# Patient Record
Sex: MALE | Race: WHITE | NOT HISPANIC OR LATINO | URBAN - METROPOLITAN AREA
[De-identification: names, ages, dates, MRNs, and addresses within clinical notes are randomized per-mention and may not be internally consistent; named-entity substitution may affect disease eponyms.]

---

## 2023-08-29 LAB
ALANINE AMINOTRANSFERASE (SGPT) (U/L) IN SER/PLAS: 16 U/L (ref 10–52)
ALBUMIN (G/DL) IN SER/PLAS: 4.3 G/DL (ref 3.4–5)
ALKALINE PHOSPHATASE (U/L) IN SER/PLAS: 86 U/L (ref 33–136)
ANION GAP IN SER/PLAS: 13 MMOL/L (ref 10–20)
ASPARTATE AMINOTRANSFERASE (SGOT) (U/L) IN SER/PLAS: 19 U/L (ref 9–39)
BASOPHILS (10*3/UL) IN BLOOD BY AUTOMATED COUNT: 0.09 X10E9/L (ref 0–0.1)
BASOPHILS/100 LEUKOCYTES IN BLOOD BY AUTOMATED COUNT: 1.4 % (ref 0–2)
BILIRUBIN TOTAL (MG/DL) IN SER/PLAS: 0.4 MG/DL (ref 0–1.2)
CALCIDIOL (25 OH VITAMIN D3) (NG/ML) IN SER/PLAS: 39 NG/ML
CALCIUM (MG/DL) IN SER/PLAS: 9.9 MG/DL (ref 8.6–10.6)
CARBON DIOXIDE, TOTAL (MMOL/L) IN SER/PLAS: 28 MMOL/L (ref 21–32)
CHLORIDE (MMOL/L) IN SER/PLAS: 103 MMOL/L (ref 98–107)
CHOLESTEROL (MG/DL) IN SER/PLAS: 197 MG/DL (ref 0–199)
CHOLESTEROL IN HDL (MG/DL) IN SER/PLAS: 49.7 MG/DL
CHOLESTEROL/HDL RATIO: 4
CREATININE (MG/DL) IN SER/PLAS: 0.9 MG/DL (ref 0.5–1.3)
EOSINOPHILS (10*3/UL) IN BLOOD BY AUTOMATED COUNT: 0.17 X10E9/L (ref 0–0.7)
EOSINOPHILS/100 LEUKOCYTES IN BLOOD BY AUTOMATED COUNT: 2.7 % (ref 0–6)
ERYTHROCYTE DISTRIBUTION WIDTH (RATIO) BY AUTOMATED COUNT: 13.2 % (ref 11.5–14.5)
ERYTHROCYTE MEAN CORPUSCULAR HEMOGLOBIN CONCENTRATION (G/DL) BY AUTOMATED: 33.3 G/DL (ref 32–36)
ERYTHROCYTE MEAN CORPUSCULAR VOLUME (FL) BY AUTOMATED COUNT: 92 FL (ref 80–100)
ERYTHROCYTES (10*6/UL) IN BLOOD BY AUTOMATED COUNT: 5.04 X10E12/L (ref 4.5–5.9)
GFR MALE: >90 ML/MIN/1.73M2
GLUCOSE (MG/DL) IN SER/PLAS: 89 MG/DL (ref 74–99)
HEMATOCRIT (%) IN BLOOD BY AUTOMATED COUNT: 46.6 % (ref 41–52)
HEMOGLOBIN (G/DL) IN BLOOD: 15.5 G/DL (ref 13.5–17.5)
IMMATURE GRANULOCYTES/100 LEUKOCYTES IN BLOOD BY AUTOMATED COUNT: 0.5 % (ref 0–0.9)
LDL: 125 MG/DL (ref 0–99)
LEUKOCYTES (10*3/UL) IN BLOOD BY AUTOMATED COUNT: 6.2 X10E9/L (ref 4.4–11.3)
LYMPHOCYTES (10*3/UL) IN BLOOD BY AUTOMATED COUNT: 1.47 X10E9/L (ref 1.2–4.8)
LYMPHOCYTES/100 LEUKOCYTES IN BLOOD BY AUTOMATED COUNT: 23.6 % (ref 13–44)
MONOCYTES (10*3/UL) IN BLOOD BY AUTOMATED COUNT: 0.54 X10E9/L (ref 0.1–1)
MONOCYTES/100 LEUKOCYTES IN BLOOD BY AUTOMATED COUNT: 8.7 % (ref 2–10)
NEUTROPHILS (10*3/UL) IN BLOOD BY AUTOMATED COUNT: 3.94 X10E9/L (ref 1.2–7.7)
NEUTROPHILS/100 LEUKOCYTES IN BLOOD BY AUTOMATED COUNT: 63.1 % (ref 40–80)
NRBC (PER 100 WBCS) BY AUTOMATED COUNT: 0 /100 WBC (ref 0–0)
PLATELETS (10*3/UL) IN BLOOD AUTOMATED COUNT: 329 X10E9/L (ref 150–450)
POTASSIUM (MMOL/L) IN SER/PLAS: 4.4 MMOL/L (ref 3.5–5.3)
PROSTATE SPECIFIC AG (NG/ML) IN SER/PLAS: 2.38 NG/ML (ref 0–4)
PROTEIN TOTAL: 7.3 G/DL (ref 6.4–8.2)
SODIUM (MMOL/L) IN SER/PLAS: 140 MMOL/L (ref 136–145)
TRIGLYCERIDE (MG/DL) IN SER/PLAS: 114 MG/DL (ref 0–149)
UREA NITROGEN (MG/DL) IN SER/PLAS: 13 MG/DL (ref 6–23)
VLDL: 23 MG/DL (ref 0–40)

## 2023-09-27 DIAGNOSIS — M25.571 RIGHT ANKLE PAIN, UNSPECIFIED CHRONICITY: Primary | ICD-10-CM

## 2023-09-29 ENCOUNTER — HOSPITAL ENCOUNTER (OUTPATIENT)
Dept: DATA CONVERSION | Facility: HOSPITAL | Age: 63
Discharge: HOME | End: 2023-09-29
Payer: COMMERCIAL

## 2023-10-03 ENCOUNTER — TREATMENT (OUTPATIENT)
Dept: PHYSICAL THERAPY | Facility: CLINIC | Age: 63
End: 2023-10-03
Payer: COMMERCIAL

## 2023-10-03 DIAGNOSIS — M25.571 RIGHT ANKLE PAIN, UNSPECIFIED CHRONICITY: ICD-10-CM

## 2023-10-03 PROCEDURE — 97112 NEUROMUSCULAR REEDUCATION: CPT | Mod: CQ,GP

## 2023-10-03 PROCEDURE — 97110 THERAPEUTIC EXERCISES: CPT | Mod: GP

## 2023-10-03 ASSESSMENT — PAIN SCALES - GENERAL: PAINLEVEL_OUTOF10: 0 - NO PAIN

## 2023-10-03 ASSESSMENT — PAIN - FUNCTIONAL ASSESSMENT: PAIN_FUNCTIONAL_ASSESSMENT: 0-10

## 2023-10-03 NOTE — PROGRESS NOTES
"2  Physical Therapy Treatment    Patient Name: Loyd Rojas  MRN: [id number]  Today's Date: 10/3/2023  Time Calculation  Start Time: 0800  Stop Time: 0845  Time Calculation (min): 45 min      Assessment:  PT Assessment  Rehab Prognosis: Good  Assessment Comment: Pt tolerates treatment progressions without exacerbation of pain S/S, Pt reported \"fatigue\" of LE, ankle musculature, fewer LOBs observed throughout  activites as Pt requires minimal UE assist throughout.    Plan:       Current Problem  1. Right ankle pain, unspecified chronicity  PT eval and treat          Subjective   General  Pt reports reduced pain/irritation @ R ankle, feeling more steady.     Precautions     Vital Signs     Pain  Pain Assessment: 0-10  Pain Score: 0 - No pain    Objective   Cognition     Posture     Extremity/Trunk Assessment    Outcome Measures:      Treatments:  Therapeutic Exercise  Therapeutic Exercise Performed: Yes  Therapeutic Exercise Activity 1: Schwinn bike L7, 6'  Therapeutic Exercise Activity 2: Gastroc stretch 30\"x3 wedge  Therapeutic Exercise Activity 3: Soleus stretch 30\"x3  Therapeutic Exercise Activity 4: Standing PF/DF x20 each  Therapeutic Exercise Activity 5: Step ups on 6\" step 2x10  Therapeutic Exercise Activity 6: Shallow lunges 2x10 L/R  Therapeutic Exercise Activity 7: Sit to stand from chair 2x12    Balance/Neuromuscular Re-Education  Balance/Neuromuscular Re-Education Activity Performed: Yes  Balance/Neuromuscular Re-Education Activity 1: MIP on foam 3'  Balance/Neuromuscular Re-Education Activity 2: LOS on foam A/P 2'  Balance/Neuromuscular Re-Education Activity 3: LOS on foam L/R 2'  Balance/Neuromuscular Re-Education Activity 4: Sidestep on foam beam 6 laps  Balance/Neuromuscular Re-Education Activity 5: Tandemwalk F/B on foam beam 6 laps  Activity:  Endurance: Tolerates 30+ min exercise without fatigue    OP EDUCATION:       Goals:     "

## 2023-10-05 ENCOUNTER — TREATMENT (OUTPATIENT)
Dept: PHYSICAL THERAPY | Facility: CLINIC | Age: 63
End: 2023-10-05
Payer: COMMERCIAL

## 2023-10-05 ENCOUNTER — OFFICE VISIT (OUTPATIENT)
Dept: UROLOGY | Facility: HOSPITAL | Age: 63
End: 2023-10-05
Payer: COMMERCIAL

## 2023-10-05 DIAGNOSIS — M79.671 RIGHT FOOT PAIN: Primary | ICD-10-CM

## 2023-10-05 DIAGNOSIS — N48.6 PEYRONIE DISEASE: ICD-10-CM

## 2023-10-05 DIAGNOSIS — R31.29 HEMATURIA, MICROSCOPIC: Primary | ICD-10-CM

## 2023-10-05 DIAGNOSIS — N52.9 ERECTILE DYSFUNCTION, UNSPECIFIED ERECTILE DYSFUNCTION TYPE: ICD-10-CM

## 2023-10-05 DIAGNOSIS — M25.571 RIGHT ANKLE PAIN, UNSPECIFIED CHRONICITY: ICD-10-CM

## 2023-10-05 PROBLEM — R11.0 NAUSEA IN ADULT: Status: ACTIVE | Noted: 2023-10-05

## 2023-10-05 PROBLEM — M79.673 FOOT PAIN: Status: ACTIVE | Noted: 2023-10-05

## 2023-10-05 PROBLEM — R53.83 FATIGUE: Status: ACTIVE | Noted: 2023-10-05

## 2023-10-05 PROBLEM — N40.1 BPH WITH OBSTRUCTION/LOWER URINARY TRACT SYMPTOMS: Status: ACTIVE | Noted: 2023-10-05

## 2023-10-05 PROBLEM — R52 GENERALIZED BODY ACHES: Status: ACTIVE | Noted: 2023-10-05

## 2023-10-05 PROBLEM — H90.3 BILATERAL SENSORINEURAL HEARING LOSS: Status: ACTIVE | Noted: 2023-10-05

## 2023-10-05 PROBLEM — G47.00 INSOMNIA: Status: ACTIVE | Noted: 2023-10-05

## 2023-10-05 PROBLEM — R03.0 ELEVATED BP WITHOUT DIAGNOSIS OF HYPERTENSION: Status: ACTIVE | Noted: 2023-10-05

## 2023-10-05 PROBLEM — R09.82 POST-NASAL DISCHARGE: Status: ACTIVE | Noted: 2023-10-05

## 2023-10-05 PROBLEM — K21.9 GERD (GASTROESOPHAGEAL REFLUX DISEASE): Status: ACTIVE | Noted: 2023-10-05

## 2023-10-05 PROBLEM — J34.89 SINUS PRESSURE: Status: ACTIVE | Noted: 2023-10-05

## 2023-10-05 PROBLEM — F41.9 ANXIETY DISORDER: Status: ACTIVE | Noted: 2023-10-05

## 2023-10-05 PROBLEM — R11.2 NAUSEA WITH VOMITING: Status: ACTIVE | Noted: 2023-10-05

## 2023-10-05 PROBLEM — R09.81 NASAL CONGESTION: Status: ACTIVE | Noted: 2023-10-05

## 2023-10-05 PROBLEM — N13.8 BPH WITH OBSTRUCTION/LOWER URINARY TRACT SYMPTOMS: Status: ACTIVE | Noted: 2023-10-05

## 2023-10-05 PROBLEM — H93.13 TINNITUS OF BOTH EARS: Status: ACTIVE | Noted: 2023-10-05

## 2023-10-05 PROBLEM — M77.30 CALCANEAL SPUR: Status: ACTIVE | Noted: 2023-10-05

## 2023-10-05 PROBLEM — M62.830 LUMBAR PARASPINAL MUSCLE SPASM: Status: ACTIVE | Noted: 2023-10-05

## 2023-10-05 PROBLEM — J18.9 ATYPICAL PNEUMONIA: Status: ACTIVE | Noted: 2023-10-05

## 2023-10-05 PROBLEM — R51.9 WORSENING HEADACHES: Status: ACTIVE | Noted: 2023-10-05

## 2023-10-05 PROBLEM — K52.9 CHRONIC DIARRHEA: Status: ACTIVE | Noted: 2023-10-05

## 2023-10-05 PROBLEM — M62.838 MUSCLE SPASMS OF NECK: Status: ACTIVE | Noted: 2023-10-05

## 2023-10-05 PROBLEM — R31.9 HEMATURIA: Status: ACTIVE | Noted: 2023-10-05

## 2023-10-05 PROBLEM — R53.83 FATIGUE: Status: RESOLVED | Noted: 2023-10-05 | Resolved: 2023-10-05

## 2023-10-05 PROBLEM — G43.909 MIGRAINE, UNSPECIFIED, NOT INTRACTABLE, WITHOUT STATUS MIGRAINOSUS: Status: ACTIVE | Noted: 2023-10-05

## 2023-10-05 PROBLEM — R06.02 SOB (SHORTNESS OF BREATH) ON EXERTION: Status: ACTIVE | Noted: 2023-10-05

## 2023-10-05 PROBLEM — F17.210 CIGARETTE NICOTINE DEPENDENCE WITHOUT COMPLICATION: Status: ACTIVE | Noted: 2023-10-05

## 2023-10-05 PROBLEM — R51.9 HEADACHE: Status: ACTIVE | Noted: 2023-10-05

## 2023-10-05 PROCEDURE — 97110 THERAPEUTIC EXERCISES: CPT | Mod: GP

## 2023-10-05 PROCEDURE — 99214 OFFICE O/P EST MOD 30 MIN: CPT | Performed by: UROLOGY

## 2023-10-05 PROCEDURE — 99204 OFFICE O/P NEW MOD 45 MIN: CPT | Performed by: UROLOGY

## 2023-10-05 RX ORDER — OMEPRAZOLE 40 MG/1
40 CAPSULE, DELAYED RELEASE ORAL DAILY
COMMUNITY
End: 2024-01-15

## 2023-10-05 RX ORDER — FLUTICASONE PROPIONATE 50 MCG
1 SPRAY, SUSPENSION (ML) NASAL NIGHTLY
COMMUNITY

## 2023-10-05 RX ORDER — OXYCODONE AND ACETAMINOPHEN 5; 325 MG/1; MG/1
1 TABLET ORAL EVERY 6 HOURS PRN
COMMUNITY
End: 2024-05-16 | Stop reason: ALTCHOICE

## 2023-10-05 RX ORDER — CEPHALEXIN 500 MG/1
500 CAPSULE ORAL 3 TIMES DAILY
COMMUNITY
Start: 2023-07-27

## 2023-10-05 RX ORDER — OXYCODONE AND ACETAMINOPHEN 5; 325 MG/1; MG/1
1 TABLET ORAL EVERY 6 HOURS PRN
COMMUNITY
Start: 2023-07-20 | End: 2024-05-16 | Stop reason: ALTCHOICE

## 2023-10-05 RX ORDER — RIVAROXABAN 10 MG/1
10 TABLET, FILM COATED ORAL DAILY
COMMUNITY
Start: 2023-07-20 | End: 2024-05-16 | Stop reason: ALTCHOICE

## 2023-10-05 RX ORDER — CYCLOBENZAPRINE HCL 10 MG
10 TABLET ORAL 3 TIMES DAILY PRN
COMMUNITY
Start: 2023-07-20 | End: 2024-05-16 | Stop reason: ALTCHOICE

## 2023-10-05 RX ORDER — HYDROCODONE BITARTRATE AND ACETAMINOPHEN 5; 325 MG/1; MG/1
1 TABLET ORAL EVERY 4 HOURS PRN
COMMUNITY
Start: 2023-01-24

## 2023-10-05 RX ORDER — NAPROXEN SODIUM 220 MG
TABLET ORAL
COMMUNITY
Start: 2017-12-18

## 2023-10-05 RX ORDER — ONDANSETRON 4 MG/1
4 TABLET, FILM COATED ORAL EVERY 6 HOURS PRN
COMMUNITY
Start: 2023-07-20

## 2023-10-05 RX ORDER — ACETAMINOPHEN 500 MG
TABLET ORAL
COMMUNITY
Start: 2017-12-18

## 2023-10-05 RX ORDER — SULFAMETHOXAZOLE AND TRIMETHOPRIM 800; 160 MG/1; MG/1
1 TABLET ORAL 2 TIMES DAILY
COMMUNITY
Start: 2023-07-27 | End: 2024-05-16 | Stop reason: ALTCHOICE

## 2023-10-05 RX ORDER — SILDENAFIL CITRATE 20 MG/1
1-5 TABLET ORAL SEE ADMIN INSTRUCTIONS
COMMUNITY
Start: 2018-10-31

## 2023-10-05 RX ORDER — PREDNISONE 20 MG/1
20 TABLET ORAL DAILY
COMMUNITY
Start: 2022-10-19 | End: 2024-05-16 | Stop reason: ALTCHOICE

## 2023-10-05 ASSESSMENT — ENCOUNTER SYMPTOMS
LOSS OF SENSATION IN FEET: 0
OCCASIONAL FEELINGS OF UNSTEADINESS: 0
DEPRESSION: 0

## 2023-10-05 ASSESSMENT — PAIN SCALES - GENERAL: PAINLEVEL_OUTOF10: 0 - NO PAIN

## 2023-10-05 ASSESSMENT — PAIN - FUNCTIONAL ASSESSMENT: PAIN_FUNCTIONAL_ASSESSMENT: 0-10

## 2023-10-05 NOTE — PROGRESS NOTES
NAME:Loyd Rojas  DATE: 10/5/2023               Subjective:   Chief complaint: Microscopic hematuria    HPI:  63 y.o. male presenting for evaluation of peyronie's disease and microscopic hematuria at the request of Dr. Fang    Pts main concern is some penile curvature.  Reports some right lateral curve, approx 20deg, does have some hinge defect.  Unable to have penetrative intercourse.  Erections not great.  No improvement with PDE5i.  Denies any inciting event    Pt had MENDEZ (8.31.23) - No stones, hydro, masses. small simple cysts. Prostate measured 60g.  Reports he has had multiple prior cystoscopy's all negative.  Last a few years ago     Past Medical History:   Diagnosis Date    Cough variant asthma 09/19/2014    Cough variant asthma    Deficiency of other specified B group vitamins     Vitamin B12 deficiency    Other conditions influencing health status 11/20/2017    History of cough    Personal history of other diseases of the musculoskeletal system and connective tissue 01/02/2014    History of low back pain    Personal history of other diseases of the respiratory system 05/19/2018    History of acute sinusitis    Personal history of other diseases of the respiratory system 06/22/2017    History of acute sinusitis    Personal history of other diseases of the respiratory system 09/19/2014    History of acute bronchitis with bronchospasm    Personal history of other specified conditions 06/22/2017    History of nasal congestion    Personal history of other specified conditions 06/22/2017    History of diarrhea    Personal history of other specified conditions 03/04/2016    History of headache     Past Surgical History:   Procedure Laterality Date    BACK SURGERY  01/02/2014    Back Surgery    OTHER SURGICAL HISTORY  01/02/2014    Facial Surgery    TONSILLECTOMY  01/02/2014    Tonsillectomy     Social History     Tobacco Use    Smoking status: Not on file    Smokeless tobacco: Not on file   Substance Use  Topics    Alcohol use: Not on file     Family History   Problem Relation Name Age of Onset    Hypertension Mother      Dementia Father      Hypertension Brother      Kidney cancer Mother's Brother      Heart attack Maternal Grandfather      Dementia Other Family history     Skin cancer Other Family history     Other (stromal sarcoma of the stomach) Other Family history      [unfilled]  No current outpatient medications on file prior to visit.     No current facility-administered medications on file prior to visit.     Loyd has No Known Allergies.     Review of Systems    14 point ROS reviewed and discussed with the patient. Pertinent positives/negatives discussed in the History of Present Illness (HPI).      Objective:     There is no height or weight on file to calculate BMI.   There were no vitals taken for this visit.     Physical Exam   1. Constitutional: NAD, Well-developed, Well-nourished  2. Respiratory: Unlabored breathing, no audible wheezes, no use of accessory muscles   3. Cardiovascular: No JVD, extremities perfused, no edema  4. Abdomen: Soft, non-tender, non-distended, no masses or hernia.  No CVA tenderness.    5. Skin: no visible lesions, plaque, rashes, jaundice  6. Neuro: Gait normal, no focal neurologic deficit  7. Psychiatric: Mood and affect normal and appropriate, alert and oriented  8. :    Phallus: Normal,  dorsal plaque   Meatus: Orthotopic and patent.   Testes:  Descended bilaterally, symmetric, without tenderness or mass.   Epididymis is normal bilaterally.  No hydrocele.  No varicocele.   Scrotum: No lesions.   Inguinal canal: No hernia or tenderness.      Labs  Lab Results   Component Value Date    BUN 13 08/29/2023    CREATININE 0.90 08/29/2023     08/29/2023    K 4.4 08/29/2023     08/29/2023    CO2 28 08/29/2023    CALCIUM 9.9 08/29/2023      Lab Results   Component Value Date    WBC 6.2 08/29/2023    RBC 5.04 08/29/2023    HGB 15.5 08/29/2023    HCT 46.6 08/29/2023     MCV 92 08/29/2023    MCHC 33.3 08/29/2023    RDW 13.2 08/29/2023     08/29/2023        Lab Results   Component Value Date    PSA 2.38 08/29/2023    PSA 1.63 01/07/2020    PSA 1.52 11/20/2017        Assessment/Plan:   Loyd Rojas presents with       1. Hematuria, microscopic    2. Peyronie disease    3. Erectile dysfunction, unspecified erectile dysfunction type        1) Has had prior negative workup, MENDEZ reviewed.  Wants to hold off another cystoscopy at this time    2) Peyronie's Disease  -Poor erections, slight lateral curve with some hinge defect    The patient presents with Peyronie's disease.  I educated the patient about the disease, and discussed conservative versus surgical management of the disease.  Furthermore, I clarified that the disease usually presents in two phases. The first is an initial active phase characterized by inflammation and possible associated pain, and a second quiescent phase. I explained that for a patient to be a surgical candidate he must have stable and mature disease.  We discussed use of in-office intracavernosal injection (ICI) with or without duplex Doppler ultrasound as further diagnostic testing.    Will schedule for penile doppler

## 2023-10-05 NOTE — PROGRESS NOTES
Physical Therapy    Physical Therapy Treatment    Patient Name: Loyd Rojas  MRN: [id number]  Today's Date: 10/5/2023  Visit 7/10         Assessment:  PT Assessment  Rehab Prognosis: Good    Emphasis of todays treatment was to focus on CKC exercises and improving tolerance to activity with standing exercises.  Pt will cont to progress with skilled PT to continue to address above impairments      Plan:   Cont to address ecc loading of achilles within pain free ranges     Current Problem  1. Right foot pain        2. Right ankle pain, unspecified chronicity  PT eval and treat          Subjective   General  Reason for Referral: pt reports min soreness day after PT, educated pt to cont with strengthening exercises at home       Pain  Pain Assessment: 0-10  Pain Score: 0 - No pain    Objective   Good tolerance to activity with ecc loading          Treatments:  Therapeutic Exercise  Therapeutic Exercise Performed: Yes  Therapeutic Exercise Activity 1: schwinn bike x 5 mins  Therapeutic Exercise Activity 2: sit to stands 3 x 10  Therapeutic Exercise Activity 3: Purple tband SL stance 10 x 5 sec holds  Therapeutic Exercise Activity 4: Purple tband calf raises 2 x 10  Therapeutic Exercise Activity 5: 6 in step downs 3 x 10  Therapeutic Exercise Activity 6: Standing calf stretch 3x10  Therapeutic Exercise Activity 7: rocker board 3 x 1 min      OP EDUCATION:  Education  Individual(s) Educated: Patient    Goals:  Encounter Problems       Encounter Problems (Active)       PT Problem       pt will demonstrate indep gait without pain or functional limitations       Start:  10/05/23    Expected End:  01/03/24            pt will demonstrate negotiating 8 steps without pain or functional limitations        Start:  10/05/23    Expected End:  01/03/24            pt will report no greater than 2/10 pain       Start:  10/05/23    Expected End:  01/03/24            pt will be indep with HEP       Start:  10/05/23    Expected End:   01/03/24

## 2023-10-10 ENCOUNTER — TREATMENT (OUTPATIENT)
Dept: PHYSICAL THERAPY | Facility: CLINIC | Age: 63
End: 2023-10-10
Payer: COMMERCIAL

## 2023-10-10 DIAGNOSIS — M25.571 RIGHT ANKLE PAIN, UNSPECIFIED CHRONICITY: ICD-10-CM

## 2023-10-10 PROCEDURE — 97110 THERAPEUTIC EXERCISES: CPT | Mod: CQ,GP

## 2023-10-10 PROCEDURE — 97112 NEUROMUSCULAR REEDUCATION: CPT | Mod: CQ,GP

## 2023-10-10 ASSESSMENT — PAIN SCALES - GENERAL: PAINLEVEL_OUTOF10: 0 - NO PAIN

## 2023-10-10 ASSESSMENT — PAIN - FUNCTIONAL ASSESSMENT: PAIN_FUNCTIONAL_ASSESSMENT: 0-10

## 2023-10-10 NOTE — PROGRESS NOTES
"Physical Therapy    Physical Therapy Treatment    Patient Name: Loyd Rojas  MRN: [id number]  Today's Date: 10/10/2023  Time Calculation  Start Time: 0758  Stop Time: 0845  Time Calculation (min): 47 minVisit 8/10      Assessment:  PT Assessment  Rehab Prognosis: Excellent  Assessment Comment: Pt tolerates treatment without exacerbation of pain S/S, demonstrates improved gait mechanics and improved recovery after ADLs and increasing standing work type activity at home.        Current Problem  1. Right ankle pain, unspecified chronicity  PT eval and treat          Subjective    Pt reports walking 1 mile then worked on standing activities for remainder of the day with mild soreness on waking this morning.     Pain  Pain Assessment: 0-10  Pain Score: 0 - No pain    Objective Minimal gait deviation with AMB, minimal LOBs during balance activities.      Treatments:  Therapeutic Exercise  Therapeutic Exercise Performed: Yes  Therapeutic Exercise Activity 1: schwinn bike L7, 6'  Therapeutic Exercise Activity 2: Gastroc stretch 30\"x3 wedge  Therapeutic Exercise Activity 3: ecentric calf raises 2x15  Therapeutic Exercise Activity 4: Gastroc stretch 30\"x3 on step  Therapeutic Exercise Activity 5: Squats on foam 2x10  Therapeutic Exercise Activity 6: Rocker board A/P x 3'    Balance/Neuromuscular Re-Education  Balance/Neuromuscular Re-Education Activity Performed: Yes  Balance/Neuromuscular Re-Education Activity 1: MIP on foam 3'  Balance/Neuromuscular Re-Education Activity 2: Sidestep on foam beam 6 laps  Balance/Neuromuscular Re-Education Activity 3: Tandemwalk F/B on foam beam 6 laps  Balance/Neuromuscular Re-Education Activity 4: Sport core walk outs x10  Balance/Neuromuscular Re-Education Activity 5: Retro sport cord walkouts x10  Balance/Neuromuscular Re-Education Activity 6: Step tap on foam to 6\" step 3x10 /R each  Activity:  Endurance: Tolerates 30+ min exercise without fatigue    Goals:   will report one full day " of work (full duty) without pain to indicate ability to return to work and ADLs without limitation.     will report driving without pain to allow for return to work and ADLs without limitation.    will improve LEFS score by at least 9 points (minimal clinically important difference) in order to reflect increased ease in completing ADL's/IADL's     to increase core/B LE strength in deficient muscles by >/= 1/2 MMT grade to improve dynamic stability during household/recreational/occupational tasks.     will demonstrate independence and report compliance with HEP to facilitate independent rehab program upon discharge.

## 2023-10-12 ENCOUNTER — TREATMENT (OUTPATIENT)
Dept: PHYSICAL THERAPY | Facility: CLINIC | Age: 63
End: 2023-10-12
Payer: COMMERCIAL

## 2023-10-12 DIAGNOSIS — M25.571 RIGHT ANKLE PAIN, UNSPECIFIED CHRONICITY: ICD-10-CM

## 2023-10-12 PROCEDURE — 97110 THERAPEUTIC EXERCISES: CPT | Mod: GP,CQ

## 2023-10-12 PROCEDURE — 97112 NEUROMUSCULAR REEDUCATION: CPT | Mod: CQ,GP

## 2023-10-12 ASSESSMENT — PAIN - FUNCTIONAL ASSESSMENT: PAIN_FUNCTIONAL_ASSESSMENT: 0-10

## 2023-10-12 ASSESSMENT — PAIN SCALES - GENERAL: PAINLEVEL_OUTOF10: 0 - NO PAIN

## 2023-10-12 NOTE — PROGRESS NOTES
"Physical Therapy    Physical Therapy Treatment    Patient Name: Loyd Rojas  MRN: [id number]  Today's Date: 10/12/2023   Visit 9/10  Assessment:  PT Assessment  Rehab Prognosis: Excellent  Assessment Comment: Tolerates multiplanar movement with R ankle fatigue transient increases in irritation      Current Problem  1. Right ankle pain, unspecified chronicity  PT eval and treat       Subjective   General   Pt reports completing ADLs with minimal irritation, does state soreness with extended standing activity with expected recovery time.  Pain  Pain Assessment: 0-10  Pain Score: 0 - No pain    Objective   Moderate brett observed during agility ladder activities.    Treatments:  Therapeutic Exercise  Therapeutic Exercise Performed: Yes  Therapeutic Exercise Activity 1: schwinn bike L7, 6'  Therapeutic Exercise Activity 2: Gastroc stretch 30\"x3 wedge  Therapeutic Exercise Activity 3: ecentric calf raises 2x15  Therapeutic Exercise Activity 4: Gastroc stretch 30\"x3 on step  Therapeutic Exercise Activity 5: Squats on foam 2x10  Therapeutic Exercise Activity 6: Rocker board A/P x 3'  Therapeutic Exercise Activity 7: Rocker board L/R x2'    Balance/Neuromuscular Re-Education  Balance/Neuromuscular Re-Education Activity Performed: Yes  Balance/Neuromuscular Re-Education Activity 1: MIP on foam 3'  Balance/Neuromuscular Re-Education Activity 2: FWD lunges onto BOSU 2x10 L/R  Balance/Neuromuscular Re-Education Activity 3: Step tap on foam to 8\" step 2x10 /R each  Balance/Neuromuscular Re-Education Activity 4: Agility ladder sidestepping, FWD 2 feet in, FWD 1 foot in, and lateral 3 steps, 3 laps each  Activity:  Endurance: Tolerates 30+ min exercise without fatigue      Goals:   will report one full day of work (full duty) without pain to indicate ability to return to work and ADLs without limitation.      will report driving without pain to allow for return to work and ADLs without limitation.     will improve LEFS score " by at least 9 points (minimal clinically important difference) in order to reflect increased ease in completing ADL's/IADL's      to increase core/B LE strength in deficient muscles by >/= 1/2 MMT grade to improve dynamic stability during household/recreational/occupational tasks.      will demonstrate independence and report compliance with HEP to facilitate independent rehab program upon discharge.

## 2023-10-17 ENCOUNTER — TREATMENT (OUTPATIENT)
Dept: PHYSICAL THERAPY | Facility: CLINIC | Age: 63
End: 2023-10-17
Payer: COMMERCIAL

## 2023-10-17 DIAGNOSIS — M25.571 RIGHT ANKLE PAIN, UNSPECIFIED CHRONICITY: ICD-10-CM

## 2023-10-17 DIAGNOSIS — M79.671 RIGHT FOOT PAIN: Primary | ICD-10-CM

## 2023-10-17 PROCEDURE — 97110 THERAPEUTIC EXERCISES: CPT | Mod: GP

## 2023-10-17 ASSESSMENT — PAIN SCALES - GENERAL: PAINLEVEL_OUTOF10: 0 - NO PAIN

## 2023-10-17 ASSESSMENT — PAIN - FUNCTIONAL ASSESSMENT: PAIN_FUNCTIONAL_ASSESSMENT: 0-10

## 2023-10-17 NOTE — PROGRESS NOTES
Physical Therapy Progress Report    Patient Name: Loyd Rojas  MRN: [id number]  Today's Date: 10/17/2023       Current Problem   1. Right foot pain        2. Right ankle pain, unspecified chronicity  PT eval and treat          Subjective   General   Reason for Referral: Reassessment for R achilles repair  Pain   Pain Assessment: 0-10  Pain Score: 0 - No pain    Objective   ROM   R ankle AROM: WFL  L Ankle AROM: WFL  MMT   Functional testing:  R Ankle:5/5  L ankle 5/5      Palpation :  No pain on palpation    Transfers :  Sit to stand transfers indep    Gait   Indep gait, no functional limitations present   Stairs   Recip step gait pattern present     Balance   Single leg: Firm Eyes Open 10 sec WFL      Treatments:  Nu step  x 5 minutes  Reassessment  Review HEP    Assessment   Assessment:    Loyd presents to the clinic for a formal reassessment today. Pt is demonstrating signficiant improvement with respect to to strength, AROM, balance, and functional mobility. Pt does not report any pain or functional limitations at this time and has successfully met all of his PT goals. Pt will be discharged from PT at this time      Plan:     D.C PT    OP EDUCATION:       Goals:   Resolved       PT Problem       pt will demonstrate indep gait without pain or functional limitations (Met)       Start:  10/05/23    Expected End:  01/03/24    Resolved:  10/17/23         pt will demonstrate negotiating 8 steps without pain or functional limitations  (Met)       Start:  10/05/23    Expected End:  01/03/24    Resolved:  10/17/23         pt will report no greater than 2/10 pain (Met)       Start:  10/05/23    Expected End:  01/03/24    Resolved:  10/17/23         pt will be indep with HEP (Met)       Start:  10/05/23    Expected End:  01/03/24    Resolved:  10/17/23

## 2023-10-20 ENCOUNTER — PROCEDURE VISIT (OUTPATIENT)
Dept: UROLOGY | Facility: HOSPITAL | Age: 63
End: 2023-10-20
Payer: COMMERCIAL

## 2023-10-20 ENCOUNTER — PREP FOR PROCEDURE (OUTPATIENT)
Dept: UROLOGY | Facility: HOSPITAL | Age: 63
End: 2023-10-20

## 2023-10-20 ENCOUNTER — HOSPITAL ENCOUNTER (OUTPATIENT)
Facility: HOSPITAL | Age: 63
Setting detail: OUTPATIENT SURGERY
End: 2023-10-20
Attending: UROLOGY | Admitting: UROLOGY
Payer: COMMERCIAL

## 2023-10-20 VITALS — SYSTOLIC BLOOD PRESSURE: 125 MMHG | DIASTOLIC BLOOD PRESSURE: 92 MMHG

## 2023-10-20 DIAGNOSIS — N52.9 ERECTILE DYSFUNCTION, UNSPECIFIED ERECTILE DYSFUNCTION TYPE: Primary | ICD-10-CM

## 2023-10-20 DIAGNOSIS — N48.6 PEYRONIE DISEASE: ICD-10-CM

## 2023-10-20 PROCEDURE — 54235 NJX CORPORA CAVERNOSA RX AGT: CPT | Performed by: UROLOGY

## 2023-10-20 PROCEDURE — 99214 OFFICE O/P EST MOD 30 MIN: CPT | Mod: 25 | Performed by: UROLOGY

## 2023-10-20 PROCEDURE — 93980 PENILE VASCULAR STUDY: CPT | Performed by: UROLOGY

## 2023-10-20 PROCEDURE — 99214 OFFICE O/P EST MOD 30 MIN: CPT | Performed by: UROLOGY

## 2023-10-20 RX ORDER — SODIUM CHLORIDE, SODIUM LACTATE, POTASSIUM CHLORIDE, CALCIUM CHLORIDE 600; 310; 30; 20 MG/100ML; MG/100ML; MG/100ML; MG/100ML
100 INJECTION, SOLUTION INTRAVENOUS CONTINUOUS
Status: CANCELLED | OUTPATIENT
Start: 2023-10-20

## 2023-10-20 RX ORDER — VANCOMYCIN HYDROCHLORIDE 1 G/200ML
1000 INJECTION, SOLUTION INTRAVENOUS ONCE
Status: CANCELLED | OUTPATIENT
Start: 2023-10-20 | End: 2023-10-20

## 2023-10-20 RX ORDER — FLUCONAZOLE 2 MG/ML
200 INJECTION, SOLUTION INTRAVENOUS ONCE
Status: CANCELLED | OUTPATIENT
Start: 2023-10-20

## 2023-10-20 RX ORDER — GABAPENTIN 300 MG/1
600 CAPSULE ORAL ONCE
Status: CANCELLED | OUTPATIENT
Start: 2023-10-20 | End: 2023-10-20

## 2023-10-20 RX ORDER — CHLORHEXIDINE GLUCONATE 40 MG/ML
SOLUTION TOPICAL DAILY PRN
Status: CANCELLED | OUTPATIENT
Start: 2023-10-20

## 2023-10-20 RX ORDER — ACETAMINOPHEN 325 MG/1
650 TABLET ORAL ONCE
Status: CANCELLED | OUTPATIENT
Start: 2023-10-20 | End: 2023-10-20

## 2023-10-20 ASSESSMENT — PAIN SCALES - GENERAL: PAINLEVEL: 0-NO PAIN

## 2023-10-20 NOTE — PROGRESS NOTES
[facility]    Last seen - 10.5.23     HISTORY OF PRESENT ILLNESS:   Loyd Rojas is a 63 y.o. male who is being seen today for penile doppler     Pts main concern is some penile curvature.  Reports some right lateral curve, approx 20deg, does have some hinge defect.  Unable to have penetrative intercourse.  Erections not great.  No improvement with PDE5i.  Denies any inciting event     PAST MEDICAL HISTORY:  Past Medical History:   Diagnosis Date    Cough variant asthma 09/19/2014    Cough variant asthma    Deficiency of other specified B group vitamins     Vitamin B12 deficiency    Other conditions influencing health status 11/20/2017    History of cough    Personal history of other diseases of the musculoskeletal system and connective tissue 01/02/2014    History of low back pain    Personal history of other diseases of the respiratory system 05/19/2018    History of acute sinusitis    Personal history of other diseases of the respiratory system 06/22/2017    History of acute sinusitis    Personal history of other diseases of the respiratory system 09/19/2014    History of acute bronchitis with bronchospasm    Personal history of other specified conditions 06/22/2017    History of nasal congestion    Personal history of other specified conditions 06/22/2017    History of diarrhea    Personal history of other specified conditions 03/04/2016    History of headache       PAST SURGICAL HISTORY:  Past Surgical History:   Procedure Laterality Date    BACK SURGERY  01/02/2014    Back Surgery    OTHER SURGICAL HISTORY  01/02/2014    Facial Surgery    TONSILLECTOMY  01/02/2014    Tonsillectomy        ALLERGIES:   No Known Allergies     MEDICATIONS:   Current Outpatient Medications   Medication Instructions    cephalexin (KEFLEX) 500 mg, oral, 3 times daily    cholecalciferol (Vitamin D-3) 50 mcg (2,000 unit) capsule oral    cyclobenzaprine (FLEXERIL) 10 mg, oral, 3 times daily PRN    fluticasone (Flonase) 50 mcg/actuation  nasal spray 1 spray, Each Nostril, Nightly    HYDROcodone-acetaminophen (Norco) 5-325 mg tablet 1 tablet, oral, Every 4 hours PRN    naproxen sodium (Aleve) 220 mg tablet oral    omeprazole (PRILOSEC) 40 mg, oral, Daily    ondansetron (ZOFRAN) 4 mg, oral, Every 6 hours PRN    oxyCODONE-acetaminophen (Percocet) 5-325 mg tablet 1 tablet, oral, Every 6 hours PRN    oxyCODONE-acetaminophen (Percocet) 5-325 mg tablet 1 tablet, oral, Every 6 hours PRN    predniSONE (DELTASONE) 20 mg, oral, Daily    sildenafil (Revatio) 20 mg tablet 1-5 tablets, oral, See admin instructions, As needed for sexual activity     sulfamethoxazole-trimethoprim (Bactrim DS) 800-160 mg tablet 1 tablet, oral, 2 times daily    Xarelto 10 mg, oral, Daily        PHYSICAL EXAM:  Blood pressure (!) 125/92.       Labs  Lab Results   Component Value Date    PSA 2.38 08/29/2023     Lab Results   Component Value Date    GFRMALE >90 08/29/2023     Lab Results   Component Value Date    CREATININE 0.90 08/29/2023     Lab Results   Component Value Date    CHOL 197 08/29/2023     Lab Results   Component Value Date    HDL 49.7 08/29/2023     Lab Results   Component Value Date    CHHDL 4.0 08/29/2023     Lab Results   Component Value Date    LDLF 125 (H) 08/29/2023     Lab Results   Component Value Date    VLDL 23 08/29/2023     Lab Results   Component Value Date    TRIG 114 08/29/2023     Lab Results   Component Value Date    HCT 46.6 08/29/2023     Procedures  Penile doppler study    ICI injection of 10 U Trimix (30/1/10)    Anatomy scan:  Dorsal plaque, slight dorsal curvature.      Post Injection flow at 10min    Right Side: Peak Systolic:  33.7 cm/s   End Diastolic:  8.97 cm/s RI: 0.73  Left SIde: Peak Systolic:  23.07 cm/s   End Diastolic 6.15 cm/s RI: 0.73    Impression: Combined arterial insufficiency and veno-occlusive disease, Peyronies disease      Pt tolerated procedure well, pt instructed to proceed to ER if he develops a persistent erection lasting  longer than 4 hours.  Pt demonstrated understanding of importance of a priapism.     Assessment:      1. Erectile dysfunction, unspecified erectile dysfunction type        2. Peyronie disease             Plan:   1)  Penile Prosthesis  The patient has erectile dysfunction refractory to conservative management. Etiology of his erectile dysfunction is Combined arterial insufficiency and veno-occlusive disease, Peyronies disease      We discussed that further treatment would require penile prosthesis surgery.  We discussed that this artificial device would be placed into the penis and disrupt the tissue that creates natural erections so he can never go back to another kind of treatment for erections.  His penis may look and feel different, even with a completely deflated prosthesis.  We discussed that this procedure will not make his penis longer, and in fact he may look smaller then his previous natural erections.  We may have the patient use the vacuum erectile device to increase blood flow to his penis, if he is amenable.      We discussed the differences between the malleable (semirigid) and inflatable (hydraulic) types of penile prosthesis.  We discussed that the inflatable prosthesis would also include a pump in the scrotum and a reservoir that would be placed in the pelvis or abdominal wall.  We discussed that sometimes placement of the reservoir requires a second incision.  Models were used to show the prosthesis and patient was able to work with them.    Risks of the surgery were discussed, which include but are not limited to pain, bleeding, erosion of the prosthesis, mechanical failure requiring surgical replacement, urinary retention, infection of the prosthesis requiring surgical removal and/or replacement, and damage to surrounding structures including the penis, urethra, bladder, and pelvic structures.      After all of the patient's questions were satisfactorily answered, he expressed understanding of  the risks of surgery and wishes to proceed.  No barriers to learning were identified.    Patient ill be scheduled in a timely fashion.  He will see pre-admission testing and obtain any clearance if necessary.     Plan for [facility] IP

## 2023-12-21 ENCOUNTER — TELEPHONE (OUTPATIENT)
Dept: PRIMARY CARE | Facility: CLINIC | Age: 63
End: 2023-12-21
Payer: COMMERCIAL

## 2023-12-21 NOTE — TELEPHONE ENCOUNTER
Patient is calling because he was exposed to Covid and his symptoms include Sinus congestion, headache and weakness. Patients wants to know what to do, he states he has not taken a Covid a test.

## 2024-01-02 ENCOUNTER — DOCUMENTATION (OUTPATIENT)
Dept: PHYSICAL THERAPY | Facility: CLINIC | Age: 64
End: 2024-01-02
Payer: COMMERCIAL

## 2024-01-02 NOTE — PROGRESS NOTES
Physical Therapy    Discharge Summary    Name: Loyd Rojas  MRN: [id number]  : 1960  Date: 24    Discharge Summary: PT    Discharge Information: Date of discharge 2024    Rehab Discharge Reason: Achieved all and/or the most significant goals(s)

## 2024-01-04 ENCOUNTER — TELEPHONE (OUTPATIENT)
Dept: PREADMISSION TESTING | Facility: HOSPITAL | Age: 64
End: 2024-01-04
Payer: COMMERCIAL

## 2024-01-15 DIAGNOSIS — F41.9 ANXIETY DISORDER, UNSPECIFIED: ICD-10-CM

## 2024-01-15 RX ORDER — OMEPRAZOLE 40 MG/1
40 CAPSULE, DELAYED RELEASE ORAL DAILY
Qty: 90 CAPSULE | Refills: 1 | Status: SHIPPED | OUTPATIENT
Start: 2024-01-15

## 2024-01-30 ENCOUNTER — OFFICE VISIT (OUTPATIENT)
Dept: PRIMARY CARE | Facility: CLINIC | Age: 64
End: 2024-01-30
Payer: COMMERCIAL

## 2024-01-30 DIAGNOSIS — Z23 NEED FOR VACCINATION: ICD-10-CM

## 2024-01-30 DIAGNOSIS — R51.9 SINUS HEADACHE: Primary | ICD-10-CM

## 2024-01-30 PROCEDURE — 90750 HZV VACC RECOMBINANT IM: CPT | Performed by: INTERNAL MEDICINE

## 2024-01-30 PROCEDURE — 90471 IMMUNIZATION ADMIN: CPT | Performed by: INTERNAL MEDICINE

## 2024-01-30 PROCEDURE — 99211 OFF/OP EST MAY X REQ PHY/QHP: CPT | Performed by: INTERNAL MEDICINE

## 2024-02-01 ENCOUNTER — APPOINTMENT (OUTPATIENT)
Dept: PRIMARY CARE | Facility: CLINIC | Age: 64
End: 2024-02-01
Payer: COMMERCIAL

## 2024-02-01 ENCOUNTER — APPOINTMENT (OUTPATIENT)
Dept: UROLOGY | Facility: HOSPITAL | Age: 64
End: 2024-02-01
Payer: COMMERCIAL

## 2024-02-20 ENCOUNTER — OFFICE VISIT (OUTPATIENT)
Dept: OTOLARYNGOLOGY | Facility: CLINIC | Age: 64
End: 2024-02-20
Payer: COMMERCIAL

## 2024-02-20 VITALS — WEIGHT: 184 LBS | HEIGHT: 70 IN | BODY MASS INDEX: 26.34 KG/M2

## 2024-02-20 DIAGNOSIS — J32.1 CHRONIC FRONTAL SINUSITIS: ICD-10-CM

## 2024-02-20 DIAGNOSIS — R51.9 SINUS HEADACHE: ICD-10-CM

## 2024-02-20 DIAGNOSIS — J32.0 CHRONIC MAXILLARY SINUSITIS: Primary | ICD-10-CM

## 2024-02-20 DIAGNOSIS — H93.13 BILATERAL TINNITUS: ICD-10-CM

## 2024-02-20 DIAGNOSIS — H61.23 BILATERAL IMPACTED CERUMEN: ICD-10-CM

## 2024-02-20 DIAGNOSIS — J32.2 CHRONIC ETHMOIDAL SINUSITIS: ICD-10-CM

## 2024-02-20 DIAGNOSIS — H90.3 BILATERAL SENSORINEURAL HEARING LOSS: ICD-10-CM

## 2024-02-20 DIAGNOSIS — J30.89 NON-SEASONAL ALLERGIC RHINITIS DUE TO OTHER ALLERGIC TRIGGER: ICD-10-CM

## 2024-02-20 DIAGNOSIS — H90.3 SENSORINEURAL HEARING LOSS (SNHL) OF BOTH EARS: ICD-10-CM

## 2024-02-20 PROCEDURE — 99204 OFFICE O/P NEW MOD 45 MIN: CPT | Performed by: OTOLARYNGOLOGY

## 2024-02-20 PROCEDURE — 69210 REMOVE IMPACTED EAR WAX UNI: CPT | Performed by: OTOLARYNGOLOGY

## 2024-02-20 RX ORDER — AZELASTINE 1 MG/ML
2 SPRAY, METERED NASAL 2 TIMES DAILY
Qty: 90 ML | Refills: 1 | Status: SHIPPED | OUTPATIENT
Start: 2024-02-20

## 2024-02-20 RX ORDER — METHYLPREDNISOLONE 4 MG/1
TABLET ORAL
Qty: 21 TABLET | Refills: 0 | Status: SHIPPED | OUTPATIENT
Start: 2024-02-20 | End: 2024-05-16 | Stop reason: ALTCHOICE

## 2024-02-20 RX ORDER — FLUTICASONE PROPIONATE 50 MCG
2 SPRAY, SUSPENSION (ML) NASAL DAILY
Qty: 48 ML | Refills: 1 | Status: SHIPPED | OUTPATIENT
Start: 2024-02-20

## 2024-02-20 RX ORDER — AMOXICILLIN AND CLAVULANATE POTASSIUM 875; 125 MG/1; MG/1
1 TABLET, FILM COATED ORAL 2 TIMES DAILY
Qty: 20 TABLET | Refills: 0 | Status: SHIPPED | OUTPATIENT
Start: 2024-02-20 | End: 2024-03-01

## 2024-02-20 ASSESSMENT — ENCOUNTER SYMPTOMS
FATIGUE: 1
HEADACHES: 1
COUGH: 1
RHINORRHEA: 1

## 2024-02-20 NOTE — PROGRESS NOTES
Subjective   Patient ID: Loyd Rojas is a 63 y.o. male  HPI  Patient is complaining of a chronic history of pressure in his sinuses.  He has pressure in the maxillary and ethmoid and frontal areas bilaterally and the pressure seems to directly correlate to the change of the weather.  He complains of chronic nasal congestion.  He has no fevers or chills or nausea or vomiting.  He also complains of a chronic history of bilateral nonpulsatile tinnitus.  He has no otalgia and no otorrhea.    Review of Systems   Constitutional:  Positive for fatigue.   HENT:  Positive for congestion, ear pain, postnasal drip, rhinorrhea and tinnitus.         Decreased smell   Respiratory:  Positive for cough.    Neurological:  Positive for headaches.       Objective   Physical Exam  The following elements of a brief ear nose and throat exam were performed: External ear canals and tympanic membranes, external nose and nasal passages, oral cavity, palpation of the neck, percussion of the face, palpation of the thyroid.    There is cerumen impaction bilaterally and this was cleared using speculum and curettes.  The tympanic membranes are clear and mobile.  There is clinical evidence of hearing loss noted during conversation.  There is nasal edema and erythema and purulent drainage noted in the upper nasal passages and the posterior oropharynx.  The remainder of his exam was within normal limits.    Ear cerumen removal    Date/Time: 2/20/2024 5:20 PM    Performed by: Ajit Lemus MD  Authorized by: Ajit Lemus MD    Consent:     Consent obtained:  Verbal    Risks discussed:  Pain  Procedure details:     Location:  L ear and R ear    Procedure type: curette        Assessment/Plan   Diagnoses and all orders for this visit:  Chronic maxillary sinusitis (Primary)  -     methylPREDNISolone (Medrol Dospak) 4 mg tablets; Follow schedule on package instructions  -     amoxicillin-pot clavulanate (Augmentin) 875-125 mg tablet; Take 1 tablet  by mouth 2 times a day for 10 days.  Sinus headache  -     Referral to ENT  Chronic ethmoidal sinusitis  Chronic frontal sinusitis  Non-seasonal allergic rhinitis due to other allergic trigger  -     fluticasone (Flonase) 50 mcg/actuation nasal spray; Administer 2 sprays into each nostril once daily. Shake gently. Before first use, prime pump. After use, clean tip and replace cap.  -     azelastine (Astelin) 137 mcg (0.1 %) nasal spray; Administer 2 sprays into each nostril 2 times a day.  Bilateral tinnitus  Bilateral sensorineural hearing loss  Sensorineural hearing loss (SNHL) of both ears  -     Tympanometry Only; Future  -     Comprehensive hearing test; Future  Bilateral impacted cerumen  Other orders  -     Ear cerumen removal     1.  Chronic maxillary and ethmoid and frontal sinusitis.  The patient was started on a 10-day course of Augmentin and a Medrol pack.  2.  Chronic allergic rhinitis.  The patient was started on Flonase and Astelin nasal sprays.  3.  Chronic bilateral nonpulsatile tinnitus and hearing loss.  The patient was scheduled for an audiogram and a tympanogram.

## 2024-03-14 ENCOUNTER — CLINICAL SUPPORT (OUTPATIENT)
Dept: AUDIOLOGY | Facility: CLINIC | Age: 64
End: 2024-03-14
Payer: COMMERCIAL

## 2024-03-14 DIAGNOSIS — H90.3 SENSORINEURAL HEARING LOSS (SNHL) OF BOTH EARS: Primary | ICD-10-CM

## 2024-03-14 DIAGNOSIS — H93.13 TINNITUS OF BOTH EARS: ICD-10-CM

## 2024-03-14 PROCEDURE — 92550 TYMPANOMETRY & REFLEX THRESH: CPT | Performed by: AUDIOLOGIST

## 2024-03-14 PROCEDURE — 92557 COMPREHENSIVE HEARING TEST: CPT | Performed by: AUDIOLOGIST

## 2024-03-14 NOTE — PROGRESS NOTES
AUDIOLOGY ADULT AUDIOMETRIC EVALUATION    Name:  Loyd Rojas  :  1960  Age:  63 y.o.  Date of Evaluation:  2024    Reason for visit: Mr. Rojas is seen in the clinic today at the request of otolaryngology for an audiologic evaluation.     HISTORY  Very pleasant patient complains of  slight hearing loss and tinnitus in both ears.  He denies fullness and dizziness and does work in noise but wears Hearing Protection in noise. He had his ears cleaned recently by Dr. Lemus and has an appointment to see him on 3/26/24 with another audiogram.  I gave him a copy of today's results. Loyd does not feel he is having communication problems with hearing loss.    EVALUATION  See scanned audiogram: “Media” > “Audiology Report”.      RESULTS  Otoscopic Evaluation:  Right Ear: clear ear canal  Left Ear: clear ear canal    Immittance Measures:  Tympanometry:  Right Ear: Type A, normal tympanic membrane mobility with normal middle ear pressure   Left Ear: Type A, normal tympanic membrane mobility with normal middle ear pressure     Acoustic Reflexes:  Ipsilateral Right Ear:  100 100 100 NR  Ipsilateral Left Ear:     100 100 100 100  Contralateral Right Ear: did not evaluate  Contralateral Left Ear: did not evaluate    Distortion Product Otoacoustic Emissions (DPOAEs):  Right Ear: REFER: 1-8 K HZ.  Left Ear:    PASS 1, 1.5, 2 K HZ  REFER: 3-8 K HZ    Audiometry:  Test Technique and Reliability: BEHAVIORAL  Standard audiometry via supra-aural headphones. Reliability is good.    Pure tone air and bone conduction audiometry:  Right Ear: WITHIN NORMAL LIMITS TO 2 K HZ WITH A MILD SNHL FROM 3- 8 K  HZ.  Left Ear: WITHIN NORMAL LIMITS TO 2 K HZ WITH A MILD MODERATE SNHL FROM 3-8 K HZ.    Speech Audiometry (Word Recognition Scores):   Right Ear:  100% EXCELLENT  Left Ear:     100% EXCELLENT    IMPRESSIONS    NORMAL TO MILD MODERATE SNHL WITH TINNITUS.    The presence of acoustic reflexes within normal intensity  limits is consistent with normal middle ear and brainstem function, and suggests that auditory sensitivity is not significantly impaired. An elevated or absent acoustic reflex threshold is consistent with a middle ear disorder, hearing loss in the stimulated ear, and/or interruption of neural innervation of the stapedius muscle. Present DPOAEs suggest normal/near normal cochlear outer hair cell function and are consistent with no greater than a mild hearing loss at those frequencies. Absent DPOAEs are consistent with abnormal cochlear outer hair cell function and some degree of hearing loss at those frequencies.    RECOMMENDATIONS  - Follow up with otolaryngology IN NEAR FUTURE as scheduled. SEE ENT FOR HEARING LOSS, TINNITUS AND IF FURTHER TESTING IS INDICATED BY ENT.  - Audiologic evaluation as needed.  - Annual audiologic evaluation, sooner if an acute change is noted.  - Audiologic evaluation in conjunction with otologic care, if an acute change is noted, and/or annually.  - Consider hearing aids.  DISCUSSED BRIEFLY AS IS BORDERLINE  CANDIDATE AND NOT REALLY HAVING PROBLEMS WITH COMMUNICATION AT HOME OR WORK.  Contact insurance if interested to determine if there is an applicable benefit and where it can be used. Contact our office to schedule an appointment should he wish to proceed with hearing aids through our clinic.  determine if there is an applicable benefit and where it can be used.  - Follow-up with audiology annually for routine hearing aid maintenance, sooner if questions/problems arise.  - Follow-up with medical care team as planned.    PATIENT EDUCATION  Discussed results, impressions and recommendations with the patient. Questions were addressed and the patient was encouraged to contact our office should concerns arise.    Time for this encounter: 40 minutes     Leny Negro  Licensed Audiologist

## 2024-03-26 ENCOUNTER — OFFICE VISIT (OUTPATIENT)
Dept: OTOLARYNGOLOGY | Facility: CLINIC | Age: 64
End: 2024-03-26
Payer: COMMERCIAL

## 2024-03-26 ENCOUNTER — APPOINTMENT (OUTPATIENT)
Dept: AUDIOLOGY | Facility: CLINIC | Age: 64
End: 2024-03-26
Payer: COMMERCIAL

## 2024-03-26 VITALS — BODY MASS INDEX: 26.34 KG/M2 | HEIGHT: 70 IN | TEMPERATURE: 98.5 F | WEIGHT: 184 LBS

## 2024-03-26 DIAGNOSIS — J34.3 HYPERTROPHY OF NASAL TURBINATES: ICD-10-CM

## 2024-03-26 DIAGNOSIS — J34.2 DEVIATED NASAL SEPTUM: ICD-10-CM

## 2024-03-26 DIAGNOSIS — R09.81 CHRONIC NASAL CONGESTION: ICD-10-CM

## 2024-03-26 DIAGNOSIS — J32.2 CHRONIC ETHMOIDAL SINUSITIS: ICD-10-CM

## 2024-03-26 DIAGNOSIS — H90.3 SENSORINEURAL HEARING LOSS (SNHL) OF BOTH EARS: ICD-10-CM

## 2024-03-26 DIAGNOSIS — J32.0 CHRONIC MAXILLARY SINUSITIS: ICD-10-CM

## 2024-03-26 DIAGNOSIS — J32.1 CHRONIC FRONTAL SINUSITIS: ICD-10-CM

## 2024-03-26 DIAGNOSIS — H93.13 BILATERAL TINNITUS: ICD-10-CM

## 2024-03-26 DIAGNOSIS — J30.89 NON-SEASONAL ALLERGIC RHINITIS DUE TO OTHER ALLERGIC TRIGGER: Primary | ICD-10-CM

## 2024-03-26 PROCEDURE — 99214 OFFICE O/P EST MOD 30 MIN: CPT | Performed by: OTOLARYNGOLOGY

## 2024-03-26 RX ORDER — MINERAL OIL
180 ENEMA (ML) RECTAL DAILY PRN
Qty: 90 TABLET | Refills: 0 | Status: SHIPPED | OUTPATIENT
Start: 2024-03-26

## 2024-03-26 RX ORDER — MONTELUKAST SODIUM 10 MG/1
10 TABLET ORAL DAILY
Qty: 90 TABLET | Refills: 0 | Status: SHIPPED | OUTPATIENT
Start: 2024-03-26

## 2024-03-26 NOTE — PROGRESS NOTES
Subjective   Patient ID: Loyd Rojas is a 63 y.o. male  HPI  Patient presents for follow-up for chronic allergic rhinitis and chronic sinusitis and bilateral tinnitus.  He continues to complain of difficulty breathing through his nose and rhinorrhea and postnasal drainage despite using the Flonase and Astelin nasal sprays.  The sinus headaches appear to have subsided after taking the antibiotics and steroids and using the nasal sprays.  Review of Systems    Objective   Physical Exam  There is a severe nasal septal deviation to the left side and inferior turbinates are enlarged.  There is nasal edema and the turbinates are pale.  There is no facial tenderness noted.  The hearing test reveals bilateral mild to moderately severe downsloping sensorineural hearing loss.  The tympanograms are normal.    Assessment/Plan   Diagnoses and all orders for this visit:  Non-seasonal allergic rhinitis due to other allergic trigger (Primary)  -     fexofenadine (Allegra) 180 mg tablet; Take 1 tablet (180 mg) by mouth once daily as needed (allergy symptoms).  -     montelukast (Singulair) 10 mg tablet; Take 1 tablet (10 mg) by mouth once daily.  Chronic maxillary sinusitis  Chronic ethmoidal sinusitis  Chronic frontal sinusitis  Bilateral tinnitus  Sensorineural hearing loss (SNHL) of both ears  Chronic nasal congestion  Deviated nasal septum  Hypertrophy of nasal turbinates     1.  Chronic maxillary and ethmoid and frontal sinusitis improved after treatment with Augmentin and Medrol.  2.  Chronic allergic rhinitis with persistent symptoms and rhinorrhea and postnasal drainage despite using Flonase and Astelin nasal sprays.  The patient was also started on Allegra and Singulair.  He was cautioned about the potential neuropsychiatric effects of Singulair.  3.  Chronic nasal congestion secondary to severe deviation of the septum and hypertrophy of the inferior turbinates.  Nasal endoscopy and septoplasty and bilateral  turbinoplasty will be considered if his nasal congestion does not improve.  4.  Bilateral mild to moderate severe downsloping sensorineural hearing loss leading to tinnitus.  The patient was medically cleared for the use of hearing aids.

## 2024-04-30 ENCOUNTER — OFFICE VISIT (OUTPATIENT)
Dept: OTOLARYNGOLOGY | Facility: CLINIC | Age: 64
End: 2024-04-30
Payer: COMMERCIAL

## 2024-04-30 VITALS — WEIGHT: 184 LBS | HEIGHT: 70 IN | BODY MASS INDEX: 26.34 KG/M2

## 2024-04-30 DIAGNOSIS — J34.2 DEVIATED NASAL SEPTUM: ICD-10-CM

## 2024-04-30 DIAGNOSIS — J30.0 VASOMOTOR RHINITIS: ICD-10-CM

## 2024-04-30 DIAGNOSIS — J32.0 CHRONIC MAXILLARY SINUSITIS: Primary | ICD-10-CM

## 2024-04-30 DIAGNOSIS — H90.3 SENSORINEURAL HEARING LOSS (SNHL) OF BOTH EARS: ICD-10-CM

## 2024-04-30 DIAGNOSIS — H93.13 BILATERAL TINNITUS: ICD-10-CM

## 2024-04-30 DIAGNOSIS — R09.81 CHRONIC NASAL CONGESTION: ICD-10-CM

## 2024-04-30 DIAGNOSIS — J30.89 NON-SEASONAL ALLERGIC RHINITIS DUE TO OTHER ALLERGIC TRIGGER: ICD-10-CM

## 2024-04-30 DIAGNOSIS — J34.3 HYPERTROPHY OF NASAL TURBINATES: ICD-10-CM

## 2024-04-30 PROCEDURE — 99214 OFFICE O/P EST MOD 30 MIN: CPT | Performed by: OTOLARYNGOLOGY

## 2024-04-30 PROCEDURE — 31231 NASAL ENDOSCOPY DX: CPT | Performed by: OTOLARYNGOLOGY

## 2024-04-30 RX ORDER — MONTELUKAST SODIUM 10 MG/1
10 TABLET ORAL DAILY
Qty: 90 TABLET | Refills: 1 | Status: SHIPPED | OUTPATIENT
Start: 2024-04-30 | End: 2024-05-16 | Stop reason: SINTOL

## 2024-04-30 RX ORDER — AZELASTINE 1 MG/ML
2 SPRAY, METERED NASAL 2 TIMES DAILY
Qty: 90 ML | Refills: 1 | Status: SHIPPED | OUTPATIENT
Start: 2024-04-30

## 2024-04-30 RX ORDER — FLUTICASONE PROPIONATE 50 MCG
2 SPRAY, SUSPENSION (ML) NASAL DAILY
Qty: 48 ML | Refills: 1 | Status: SHIPPED | OUTPATIENT
Start: 2024-04-30

## 2024-04-30 RX ORDER — IPRATROPIUM BROMIDE 42 UG/1
SPRAY, METERED NASAL
Qty: 45 ML | Refills: 1 | Status: SHIPPED | OUTPATIENT
Start: 2024-04-30

## 2024-04-30 RX ORDER — MINERAL OIL
180 ENEMA (ML) RECTAL DAILY PRN
Qty: 90 TABLET | Refills: 1 | Status: SHIPPED | OUTPATIENT
Start: 2024-04-30

## 2024-04-30 NOTE — PROGRESS NOTES
Subjective   Patient ID: Loyd Rojas is a 63 y.o. male  HPI  Patient presents for follow-up for chronic allergic rhinitis and deviation of septum and hypertrophy of the turbinates and chronic sinusitis.  He has noticed some improvement in his allergy symptoms using the Flonase and Astelin and Singulair Allegra but he continues to complain of difficulty breathing through his nose bilaterally especially at night.  He also continues to have allergy symptoms despite using the medications.  He has not had any sinus infections recently.  His nasal drainage seems to increase when eating.    Review of Systems    Objective   Physical Exam  The following elements of a brief ear nose and throat exam were performed: External ear canals and tympanic membranes, external nose and nasal passages, oral cavity, palpation of the neck, percussion of the face, palpation of the thyroid.    There is nasal edema and the turbinates are pale.  There is no purulence or facial tenderness noted.  There is a severe deviation of the septum to the right side inferiorly into the left side superiorly.  The posterior nasal passage could not be visualized.  Nasal endoscopy was offered in light of the persistent nasal congestion.  There was a severe deviation of the septum to the right side along the entire length of the septum and there is a bone spur noted on the left side.  There was no polyps or purulence in the nasopharynx and ostiomeatal complexes are noted to be clear.    Nasal / sinus endoscopy    Date/Time: 4/30/2024 4:39 PM    Performed by: Ajit Lemus MD  Authorized by: Ajit Lemus MD    Consent:     Consent obtained:  Verbal  Procedure details:     Meds administered: lidocaine and afrin.    Instrument: flexible fiberoptic nasal endoscope    Comments:      After informed consent was discussed and obtained, and after topical anesthesia was applied, the flexible endoscope was inserted atraumatically in to each nasal cavity.  Three  passes were made with the scope superiorly along the middle turbinate and middle meatus and along the floor of the nose.  Please see office note for other findings.  The patient tolerated all portions of the procedure well.     Assessment/Plan   Diagnoses and all orders for this visit:  Chronic maxillary sinusitis (Primary)  Non-seasonal allergic rhinitis due to other allergic trigger  -     Referral to Allergy; Future  -     fluticasone (Flonase) 50 mcg/actuation nasal spray; Administer 2 sprays into each nostril once daily. Shake gently. Before first use, prime pump. After use, clean tip and replace cap.  -     azelastine (Astelin) 137 mcg (0.1 %) nasal spray; Administer 2 sprays into each nostril 2 times a day.  -     fexofenadine (Allegra) 180 mg tablet; Take 1 tablet (180 mg) by mouth once daily as needed (allergy symptoms).  -     montelukast (Singulair) 10 mg tablet; Take 1 tablet (10 mg) by mouth once daily.  Deviated nasal septum  Hypertrophy of nasal turbinates  Chronic nasal congestion  -     Nasal / sinus endoscopy  Sensorineural hearing loss (SNHL) of both ears  Bilateral tinnitus  Vasomotor rhinitis  -     ipratropium (Atrovent) 42 mcg (0.06 %) nasal spray; 2 sprays in each nostril 3 times a day     1.  Chronic allergic rhinitis leading to recurrent sinusitis with partial improvement using Flonase and Astelin and Singulair Allegra.  The patient was referred to an allergist at this point for allergy testing and possible allergy shots.  His prescriptions were renewed today.  2.  Chronic nasal congestion with deviation of the septum and hypertrophy of the inferior turbinates with persistent nasal congestion despite treatment of the allergies.  The patient was offered septoplasty and bilateral submucous resection of the inferior turbinates.  The risk and benefits and alternatives were explained including the option of no surgery and if he decides to proceed then he will be scheduled to soon as  possible.  3.  Chronic vasomotor rhinitis exacerbating the nasal drainage.  The patient was started on Atrovent nasal spray.

## 2024-05-16 ENCOUNTER — OFFICE VISIT (OUTPATIENT)
Dept: PRIMARY CARE | Facility: CLINIC | Age: 64
End: 2024-05-16
Payer: MEDICARE

## 2024-05-16 VITALS
HEART RATE: 75 BPM | WEIGHT: 181.22 LBS | RESPIRATION RATE: 16 BRPM | DIASTOLIC BLOOD PRESSURE: 90 MMHG | OXYGEN SATURATION: 98 % | TEMPERATURE: 97.2 F | SYSTOLIC BLOOD PRESSURE: 145 MMHG | BODY MASS INDEX: 26 KG/M2

## 2024-05-16 DIAGNOSIS — R05.2 SUBACUTE COUGH: ICD-10-CM

## 2024-05-16 DIAGNOSIS — J32.9 CHRONIC RECURRENT SINUSITIS: Primary | ICD-10-CM

## 2024-05-16 PROBLEM — R05.9 COUGH: Status: ACTIVE | Noted: 2024-05-16

## 2024-05-16 PROCEDURE — 99213 OFFICE O/P EST LOW 20 MIN: CPT | Performed by: INTERNAL MEDICINE

## 2024-05-16 ASSESSMENT — ENCOUNTER SYMPTOMS
OCCASIONAL FEELINGS OF UNSTEADINESS: 0
COUGH: 1
DEPRESSION: 0
HEADACHES: 1
LOSS OF SENSATION IN FEET: 1

## 2024-05-16 ASSESSMENT — PATIENT HEALTH QUESTIONNAIRE - PHQ9
1. LITTLE INTEREST OR PLEASURE IN DOING THINGS: NOT AT ALL
2. FEELING DOWN, DEPRESSED OR HOPELESS: NOT AT ALL
SUM OF ALL RESPONSES TO PHQ9 QUESTIONS 1 AND 2: 0

## 2024-05-16 ASSESSMENT — PAIN SCALES - GENERAL: PAINLEVEL: 4

## 2024-05-16 NOTE — ASSESSMENT & PLAN NOTE
Most likely due to postnasal drip, advised to use steroid nasal spray.  See allergy specialist in consult.

## 2024-05-16 NOTE — PROGRESS NOTES
Subjective   Patient ID: Loyd Rojas is a 63 y.o. male who presents for Headache.    Follow-up visit.  He  complains of frontal headaches, pain around the eyes.  Postnasal drip, cough.  Has seen ENT in consult diagnosed with nonallergic chronic rhinitis ,septal deviation.  Recommended to use antihistamines, steroid nasal spray and to see allergy specialist in consult.  He is not clear about the medications he is supposed to take.  Used to have migraine manifested with visual disturbances no headaches.  Borderline hypertension .    Headache   Associated symptoms include coughing.        Review of Systems   Respiratory:  Positive for cough.    Neurological:  Positive for headaches.   All other systems reviewed and are negative.      Objective   /89 (BP Location: Left arm, Patient Position: Sitting)   Pulse 75   Temp 36.2 °C (97.2 °F) (Temporal)   Resp 16   Wt 82.2 kg (181 lb 3.5 oz)   SpO2 98%   BMI 26.00 kg/m²     Physical Exam  Constitutional:       Appearance: Normal appearance.   HENT:      Head: Normocephalic and atraumatic.      Right Ear: External ear normal.      Left Ear: External ear normal.      Mouth/Throat:      Mouth: Mucous membranes are moist.      Pharynx: Oropharynx is clear.   Neck:      Vascular: No carotid bruit.   Cardiovascular:      Rate and Rhythm: Normal rate and regular rhythm.      Heart sounds: No murmur heard.     No gallop.   Pulmonary:      Effort: Pulmonary effort is normal. No respiratory distress.      Breath sounds: No wheezing or rales.   Abdominal:      General: Abdomen is flat.      Palpations: Abdomen is soft.      Hernia: No hernia is present.   Musculoskeletal:         General: No swelling, tenderness, deformity or signs of injury.      Cervical back: No rigidity or tenderness.      Right lower leg: No edema.   Lymphadenopathy:      Cervical: No cervical adenopathy.   Skin:     Coloration: Skin is not jaundiced or pale.      Findings: No bruising, erythema,  lesion or rash.   Neurological:      General: No focal deficit present.      Mental Status: He is oriented to person, place, and time.      Motor: No weakness.      Coordination: Coordination normal.   Psychiatric:         Mood and Affect: Mood normal.         Behavior: Behavior normal.      Comments: Anxiety         Assessment/Plan   Problem List Items Addressed This Visit             ICD-10-CM    Chronic recurrent sinusitis - Primary J32.9     Have CT scan of the sinuses without contrast.  See allergy specialist in consult.         Relevant Orders    CT sinus wo IV contrast    Cough R05.9     Most likely due to postnasal drip, advised to use steroid nasal spray.  See allergy specialist in consult.

## 2024-06-13 ENCOUNTER — APPOINTMENT (OUTPATIENT)
Dept: RADIOLOGY | Facility: CLINIC | Age: 64
End: 2024-06-13
Payer: MEDICARE

## 2024-06-17 ENCOUNTER — HOSPITAL ENCOUNTER (OUTPATIENT)
Dept: RADIOLOGY | Facility: CLINIC | Age: 64
Discharge: HOME | End: 2024-06-17
Payer: MEDICARE

## 2024-06-17 DIAGNOSIS — J32.9 CHRONIC RECURRENT SINUSITIS: ICD-10-CM

## 2024-06-17 DIAGNOSIS — J32.9 CHRONIC RECURRENT SINUSITIS: Primary | ICD-10-CM

## 2024-06-17 PROCEDURE — 70486 CT MAXILLOFACIAL W/O DYE: CPT

## 2024-06-17 PROCEDURE — 70486 CT MAXILLOFACIAL W/O DYE: CPT | Performed by: RADIOLOGY

## 2024-06-18 ENCOUNTER — TELEPHONE (OUTPATIENT)
Dept: PRIMARY CARE | Facility: CLINIC | Age: 64
End: 2024-06-18
Payer: MEDICARE

## 2024-08-06 ENCOUNTER — APPOINTMENT (OUTPATIENT)
Dept: PRIMARY CARE | Facility: CLINIC | Age: 64
End: 2024-08-06
Payer: MEDICARE

## 2024-08-06 VITALS
HEIGHT: 69 IN | DIASTOLIC BLOOD PRESSURE: 80 MMHG | TEMPERATURE: 97.7 F | WEIGHT: 176.59 LBS | OXYGEN SATURATION: 97 % | HEART RATE: 60 BPM | BODY MASS INDEX: 26.16 KG/M2 | SYSTOLIC BLOOD PRESSURE: 128 MMHG | RESPIRATION RATE: 16 BRPM

## 2024-08-06 DIAGNOSIS — R51.9 ACUTE NONINTRACTABLE HEADACHE, UNSPECIFIED HEADACHE TYPE: ICD-10-CM

## 2024-08-06 DIAGNOSIS — Z00.00 INITIAL MEDICARE ANNUAL WELLNESS VISIT: Primary | ICD-10-CM

## 2024-08-06 DIAGNOSIS — N40.1 BPH WITH OBSTRUCTION/LOWER URINARY TRACT SYMPTOMS: ICD-10-CM

## 2024-08-06 DIAGNOSIS — R53.83 FATIGUE, UNSPECIFIED TYPE: ICD-10-CM

## 2024-08-06 DIAGNOSIS — N13.8 BPH WITH OBSTRUCTION/LOWER URINARY TRACT SYMPTOMS: ICD-10-CM

## 2024-08-06 DIAGNOSIS — R79.89 HIGH SERUM LOW-DENSITY LIPOPROTEIN (LDL): ICD-10-CM

## 2024-08-06 DIAGNOSIS — N52.9 ERECTILE DYSFUNCTION, UNSPECIFIED ERECTILE DYSFUNCTION TYPE: ICD-10-CM

## 2024-08-06 PROCEDURE — G0438 PPPS, INITIAL VISIT: HCPCS | Performed by: INTERNAL MEDICINE

## 2024-08-06 PROCEDURE — 1170F FXNL STATUS ASSESSED: CPT | Performed by: INTERNAL MEDICINE

## 2024-08-06 PROCEDURE — 93000 ELECTROCARDIOGRAM COMPLETE: CPT | Performed by: INTERNAL MEDICINE

## 2024-08-06 PROCEDURE — 1160F RVW MEDS BY RX/DR IN RCRD: CPT | Performed by: INTERNAL MEDICINE

## 2024-08-06 PROCEDURE — 3008F BODY MASS INDEX DOCD: CPT | Performed by: INTERNAL MEDICINE

## 2024-08-06 PROCEDURE — 99213 OFFICE O/P EST LOW 20 MIN: CPT | Performed by: INTERNAL MEDICINE

## 2024-08-06 PROCEDURE — 1036F TOBACCO NON-USER: CPT | Performed by: INTERNAL MEDICINE

## 2024-08-06 PROCEDURE — 1159F MED LIST DOCD IN RCRD: CPT | Performed by: INTERNAL MEDICINE

## 2024-08-06 ASSESSMENT — ENCOUNTER SYMPTOMS
DEPRESSION: 0
OCCASIONAL FEELINGS OF UNSTEADINESS: 0
ARTHRALGIAS: 1
NERVOUS/ANXIOUS: 1
HEADACHES: 1
DIARRHEA: 1
LOSS OF SENSATION IN FEET: 1

## 2024-08-06 ASSESSMENT — PATIENT HEALTH QUESTIONNAIRE - PHQ9
2. FEELING DOWN, DEPRESSED OR HOPELESS: NOT AT ALL
SUM OF ALL RESPONSES TO PHQ9 QUESTIONS 1 AND 2: 0
1. LITTLE INTEREST OR PLEASURE IN DOING THINGS: NOT AT ALL

## 2024-08-06 ASSESSMENT — ACTIVITIES OF DAILY LIVING (ADL)
BATHING: INDEPENDENT
GROCERY_SHOPPING: INDEPENDENT
TAKING_MEDICATION: INDEPENDENT
MANAGING_FINANCES: INDEPENDENT
GROCERY_SHOPPING: INDEPENDENT
DOING_HOUSEWORK: INDEPENDENT
DRESSING: INDEPENDENT
TAKING_MEDICATION: INDEPENDENT
DOING_HOUSEWORK: INDEPENDENT
MANAGING_FINANCES: NEEDS ASSISTANCE

## 2024-08-07 NOTE — PROGRESS NOTES
"Subjective   Patient ID: Loyd Rojas is a 64 y.o. male who presents for Annual Exam and Hand Pain (Bilateral).    Welcome to Medicare visit.  Complains of headaches, he calls them  sinus headaches since she was a teenager.  He also was complaining of postnasal drip nasal congestion, sometimes rhinorrhea . Has  had CT scan of the sinuses, has seen ENT specialist in consult, treated  with antibiotics, antihistamines, Flonase, azelastine nasal sprays.  Has a deviated septum and was suggested to have surgery.  He tells me today sinuses issues resolved after he stopped  smoking marijuana.  Headaches still persist.  Pseudoephedrine, Tylenol give him some relief.  Has seen ophthalmologist recently.  Has chronic diarrhea for years lately not more than 3 or 4 stools a day after taking iron over-the-counter.  Complains of bilateral hands pain, fingers pain mostly thumbs.  Peyronie disease, did not follow up with urologist.    Hand Pain        Review of Systems   Gastrointestinal:  Positive for diarrhea.   Musculoskeletal:  Positive for arthralgias.        Hands pain   Neurological:  Positive for headaches.   Psychiatric/Behavioral:  The patient is nervous/anxious.    All other systems reviewed and are negative.      Objective   /89 (BP Location: Left arm, Patient Position: Sitting)   Pulse 60   Temp 36.5 °C (97.7 °F) (Temporal)   Resp 16   Ht 1.764 m (5' 9.45\")   Wt 80.1 kg (176 lb 9.4 oz)   SpO2 97%   BMI 25.74 kg/m²     Physical Exam  Constitutional:       Appearance: Normal appearance.   HENT:      Head: Normocephalic and atraumatic.      Right Ear: External ear normal.      Left Ear: External ear normal.      Mouth/Throat:      Mouth: Mucous membranes are moist.      Pharynx: Oropharynx is clear.   Neck:      Vascular: No carotid bruit.   Cardiovascular:      Rate and Rhythm: Normal rate and regular rhythm.      Heart sounds: No murmur heard.     No gallop.   Pulmonary:      Effort: Pulmonary effort is " normal. No respiratory distress.      Breath sounds: No wheezing or rales.   Abdominal:      General: Abdomen is flat.      Palpations: Abdomen is soft.      Hernia: No hernia is present.   Musculoskeletal:         General: No swelling, tenderness, deformity or signs of injury.      Cervical back: No rigidity or tenderness.      Right lower leg: No edema.   Lymphadenopathy:      Cervical: No cervical adenopathy.   Skin:     Coloration: Skin is not jaundiced or pale.      Findings: No bruising, erythema, lesion or rash.   Neurological:      General: No focal deficit present.      Mental Status: He is oriented to person, place, and time.      Motor: No weakness.      Coordination: Coordination normal.   Psychiatric:         Mood and Affect: Mood normal.         Behavior: Behavior normal.       Assessment/Plan

## 2024-08-07 NOTE — ASSESSMENT & PLAN NOTE
Recommend Prevnar 20 vaccine at the local pharmacy.  Influenza vaccine in fall.  Last screening colonoscopy August 2019, no specimen collected.  He  Have fasting blood work.  EKG done in the office showed heart rate 61 normal sinus rhythm normal axis no acute ST or T changes  Follow healthy diet avoid processed meats fast foods, limit sweets no pop.  Exercise regularly.

## 2024-08-08 ASSESSMENT — PATIENT HEALTH QUESTIONNAIRE - PHQ9
2. FEELING DOWN, DEPRESSED OR HOPELESS: NOT AT ALL
1. LITTLE INTEREST OR PLEASURE IN DOING THINGS: NOT AT ALL
SUM OF ALL RESPONSES TO PHQ9 QUESTIONS 1 AND 2: 0

## 2024-08-08 ASSESSMENT — ACTIVITIES OF DAILY LIVING (ADL)
DOING_HOUSEWORK: INDEPENDENT
DRESSING: INDEPENDENT
MANAGING_FINANCES: NEEDS ASSISTANCE
TAKING_MEDICATION: INDEPENDENT
BATHING: INDEPENDENT
GROCERY_SHOPPING: INDEPENDENT

## 2024-08-14 DIAGNOSIS — F41.9 ANXIETY DISORDER, UNSPECIFIED: ICD-10-CM

## 2024-08-14 RX ORDER — OMEPRAZOLE 40 MG/1
40 CAPSULE, DELAYED RELEASE ORAL DAILY
Qty: 90 CAPSULE | Refills: 1 | Status: SHIPPED | OUTPATIENT
Start: 2024-08-14

## 2024-09-04 ENCOUNTER — HOSPITAL ENCOUNTER (OUTPATIENT)
Dept: RADIOLOGY | Facility: CLINIC | Age: 64
End: 2024-09-04
Payer: MEDICARE

## 2024-09-06 ENCOUNTER — HOSPITAL ENCOUNTER (OUTPATIENT)
Dept: RADIOLOGY | Facility: HOSPITAL | Age: 64
Discharge: HOME | End: 2024-09-06
Payer: MEDICARE

## 2024-09-06 DIAGNOSIS — M76.61 ACHILLES TENDINITIS, RIGHT LEG: ICD-10-CM

## 2024-09-06 DIAGNOSIS — M67.01 SHORT ACHILLES TENDON (ACQUIRED), RIGHT ANKLE: ICD-10-CM

## 2024-09-06 PROCEDURE — 73721 MRI JNT OF LWR EXTRE W/O DYE: CPT | Mod: RT

## 2024-09-11 ENCOUNTER — ANESTHESIA (OUTPATIENT)
Dept: OPERATING ROOM | Facility: HOSPITAL | Age: 64
End: 2024-09-11
Payer: MEDICARE

## 2024-09-11 ENCOUNTER — PHARMACY VISIT (OUTPATIENT)
Dept: PHARMACY | Facility: CLINIC | Age: 64
End: 2024-09-11
Payer: COMMERCIAL

## 2024-09-11 ENCOUNTER — APPOINTMENT (OUTPATIENT)
Dept: RADIOLOGY | Facility: HOSPITAL | Age: 64
End: 2024-09-11
Payer: MEDICARE

## 2024-09-11 ENCOUNTER — ANESTHESIA EVENT (OUTPATIENT)
Dept: OPERATING ROOM | Facility: HOSPITAL | Age: 64
End: 2024-09-11
Payer: MEDICARE

## 2024-09-11 ENCOUNTER — HOSPITAL ENCOUNTER (OUTPATIENT)
Facility: HOSPITAL | Age: 64
Setting detail: OUTPATIENT SURGERY
Discharge: HOME | End: 2024-09-11
Attending: PODIATRIST | Admitting: PODIATRIST
Payer: MEDICARE

## 2024-09-11 VITALS
RESPIRATION RATE: 18 BRPM | WEIGHT: 175 LBS | BODY MASS INDEX: 25.05 KG/M2 | HEART RATE: 64 BPM | OXYGEN SATURATION: 95 % | SYSTOLIC BLOOD PRESSURE: 141 MMHG | HEIGHT: 70 IN | DIASTOLIC BLOOD PRESSURE: 95 MMHG | TEMPERATURE: 97.2 F

## 2024-09-11 DIAGNOSIS — G89.18 POST-OP PAIN: Primary | ICD-10-CM

## 2024-09-11 DIAGNOSIS — R11.2 PONV (POSTOPERATIVE NAUSEA AND VOMITING): ICD-10-CM

## 2024-09-11 DIAGNOSIS — Z98.890 PONV (POSTOPERATIVE NAUSEA AND VOMITING): ICD-10-CM

## 2024-09-11 DIAGNOSIS — M76.61 ACHILLES TENDINITIS OF RIGHT LOWER EXTREMITY: ICD-10-CM

## 2024-09-11 PROCEDURE — 2500000004 HC RX 250 GENERAL PHARMACY W/ HCPCS (ALT 636 FOR OP/ED): Performed by: NURSE ANESTHETIST, CERTIFIED REGISTERED

## 2024-09-11 PROCEDURE — 3700000002 HC GENERAL ANESTHESIA TIME - EACH INCREMENTAL 1 MINUTE: Performed by: PODIATRIST

## 2024-09-11 PROCEDURE — 2500000004 HC RX 250 GENERAL PHARMACY W/ HCPCS (ALT 636 FOR OP/ED): Performed by: ANESTHESIOLOGIST ASSISTANT

## 2024-09-11 PROCEDURE — 2500000005 HC RX 250 GENERAL PHARMACY W/O HCPCS: Performed by: ANESTHESIOLOGIST ASSISTANT

## 2024-09-11 PROCEDURE — 7100000009 HC PHASE TWO TIME - INITIAL BASE CHARGE: Performed by: PODIATRIST

## 2024-09-11 PROCEDURE — 2780000003 HC OR 278 NO HCPCS: Performed by: PODIATRIST

## 2024-09-11 PROCEDURE — 3700000001 HC GENERAL ANESTHESIA TIME - INITIAL BASE CHARGE: Performed by: PODIATRIST

## 2024-09-11 PROCEDURE — 2500000004 HC RX 250 GENERAL PHARMACY W/ HCPCS (ALT 636 FOR OP/ED): Mod: JZ

## 2024-09-11 PROCEDURE — 3600000003 HC OR TIME - INITIAL BASE CHARGE - PROCEDURE LEVEL THREE: Performed by: PODIATRIST

## 2024-09-11 PROCEDURE — 7100000010 HC PHASE TWO TIME - EACH INCREMENTAL 1 MINUTE: Performed by: PODIATRIST

## 2024-09-11 PROCEDURE — 2500000004 HC RX 250 GENERAL PHARMACY W/ HCPCS (ALT 636 FOR OP/ED): Performed by: PODIATRIST

## 2024-09-11 PROCEDURE — 3600000008 HC OR TIME - EACH INCREMENTAL 1 MINUTE - PROCEDURE LEVEL THREE: Performed by: PODIATRIST

## 2024-09-11 PROCEDURE — A27687 PR GASTROCNEMIUS RECESSION: Performed by: ANESTHESIOLOGY

## 2024-09-11 PROCEDURE — 2500000004 HC RX 250 GENERAL PHARMACY W/ HCPCS (ALT 636 FOR OP/ED): Performed by: ANESTHESIOLOGY

## 2024-09-11 PROCEDURE — A27687 PR GASTROCNEMIUS RECESSION: Performed by: ANESTHESIOLOGIST ASSISTANT

## 2024-09-11 PROCEDURE — 7100000001 HC RECOVERY ROOM TIME - INITIAL BASE CHARGE: Performed by: PODIATRIST

## 2024-09-11 PROCEDURE — 2720000007 HC OR 272 NO HCPCS: Performed by: PODIATRIST

## 2024-09-11 PROCEDURE — 2500000005 HC RX 250 GENERAL PHARMACY W/O HCPCS: Performed by: NURSE ANESTHETIST, CERTIFIED REGISTERED

## 2024-09-11 PROCEDURE — RXMED WILLOW AMBULATORY MEDICATION CHARGE

## 2024-09-11 PROCEDURE — 7100000002 HC RECOVERY ROOM TIME - EACH INCREMENTAL 1 MINUTE: Performed by: PODIATRIST

## 2024-09-11 PROCEDURE — C1713 ANCHOR/SCREW BN/BN,TIS/BN: HCPCS | Performed by: PODIATRIST

## 2024-09-11 DEVICE — IMPL SYS,BIO-COMP ACHILLES MID-SUBSTANCE
Type: IMPLANTABLE DEVICE | Site: ANKLE | Status: FUNCTIONAL
Brand: ARTHREX®

## 2024-09-11 DEVICE — IMPLANTABLE DEVICE: Type: IMPLANTABLE DEVICE | Site: HEEL | Status: FUNCTIONAL

## 2024-09-11 RX ORDER — ACETAMINOPHEN 500 MG
1000 TABLET ORAL EVERY 6 HOURS PRN
COMMUNITY

## 2024-09-11 RX ORDER — ONDANSETRON HYDROCHLORIDE 2 MG/ML
INJECTION, SOLUTION INTRAVENOUS AS NEEDED
Status: DISCONTINUED | OUTPATIENT
Start: 2024-09-11 | End: 2024-09-11

## 2024-09-11 RX ORDER — ALBUTEROL SULFATE 0.83 MG/ML
2.5 SOLUTION RESPIRATORY (INHALATION) ONCE
Status: DISCONTINUED | OUTPATIENT
Start: 2024-09-11 | End: 2024-09-11 | Stop reason: HOSPADM

## 2024-09-11 RX ORDER — MIDAZOLAM HYDROCHLORIDE 1 MG/ML
1 INJECTION, SOLUTION INTRAMUSCULAR; INTRAVENOUS ONCE AS NEEDED
Status: DISCONTINUED | OUTPATIENT
Start: 2024-09-11 | End: 2024-09-11 | Stop reason: HOSPADM

## 2024-09-11 RX ORDER — CEFAZOLIN SODIUM 2 G/100ML
2 INJECTION, SOLUTION INTRAVENOUS ONCE
Status: COMPLETED | OUTPATIENT
Start: 2024-09-11 | End: 2024-09-11

## 2024-09-11 RX ORDER — OXYCODONE HYDROCHLORIDE 5 MG/1
5 TABLET ORAL EVERY 6 HOURS PRN
Qty: 28 TABLET | Refills: 0 | Status: SHIPPED | OUTPATIENT
Start: 2024-09-11 | End: 2024-09-18

## 2024-09-11 RX ORDER — MIDAZOLAM HYDROCHLORIDE 1 MG/ML
INJECTION, SOLUTION INTRAMUSCULAR; INTRAVENOUS AS NEEDED
Status: DISCONTINUED | OUTPATIENT
Start: 2024-09-11 | End: 2024-09-11

## 2024-09-11 RX ORDER — ONDANSETRON HYDROCHLORIDE 2 MG/ML
4 INJECTION, SOLUTION INTRAVENOUS ONCE AS NEEDED
Status: DISCONTINUED | OUTPATIENT
Start: 2024-09-11 | End: 2024-09-11 | Stop reason: HOSPADM

## 2024-09-11 RX ORDER — HYDROMORPHONE HYDROCHLORIDE 0.2 MG/ML
0.2 INJECTION INTRAMUSCULAR; INTRAVENOUS; SUBCUTANEOUS EVERY 5 MIN PRN
Status: DISCONTINUED | OUTPATIENT
Start: 2024-09-11 | End: 2024-09-11 | Stop reason: HOSPADM

## 2024-09-11 RX ORDER — PROPOFOL 10 MG/ML
INJECTION, EMULSION INTRAVENOUS AS NEEDED
Status: DISCONTINUED | OUTPATIENT
Start: 2024-09-11 | End: 2024-09-11

## 2024-09-11 RX ORDER — SODIUM CHLORIDE, SODIUM LACTATE, POTASSIUM CHLORIDE, CALCIUM CHLORIDE 600; 310; 30; 20 MG/100ML; MG/100ML; MG/100ML; MG/100ML
50 INJECTION, SOLUTION INTRAVENOUS CONTINUOUS
Status: DISCONTINUED | OUTPATIENT
Start: 2024-09-11 | End: 2024-09-11 | Stop reason: HOSPADM

## 2024-09-11 RX ORDER — KETOROLAC TROMETHAMINE 30 MG/ML
INJECTION, SOLUTION INTRAMUSCULAR; INTRAVENOUS AS NEEDED
Status: DISCONTINUED | OUTPATIENT
Start: 2024-09-11 | End: 2024-09-11

## 2024-09-11 RX ORDER — BUPIVACAINE HYDROCHLORIDE 5 MG/ML
INJECTION, SOLUTION PERINEURAL AS NEEDED
Status: DISCONTINUED | OUTPATIENT
Start: 2024-09-11 | End: 2024-09-11 | Stop reason: HOSPADM

## 2024-09-11 RX ORDER — FENTANYL CITRATE 50 UG/ML
50 INJECTION, SOLUTION INTRAMUSCULAR; INTRAVENOUS EVERY 5 MIN PRN
Status: DISCONTINUED | OUTPATIENT
Start: 2024-09-11 | End: 2024-09-11 | Stop reason: HOSPADM

## 2024-09-11 RX ORDER — SODIUM CHLORIDE, SODIUM LACTATE, POTASSIUM CHLORIDE, CALCIUM CHLORIDE 600; 310; 30; 20 MG/100ML; MG/100ML; MG/100ML; MG/100ML
100 INJECTION, SOLUTION INTRAVENOUS CONTINUOUS
Status: DISCONTINUED | OUTPATIENT
Start: 2024-09-11 | End: 2024-09-11 | Stop reason: HOSPADM

## 2024-09-11 RX ORDER — LABETALOL HYDROCHLORIDE 5 MG/ML
INJECTION, SOLUTION INTRAVENOUS AS NEEDED
Status: DISCONTINUED | OUTPATIENT
Start: 2024-09-11 | End: 2024-09-11

## 2024-09-11 RX ORDER — FENTANYL CITRATE 50 UG/ML
INJECTION, SOLUTION INTRAMUSCULAR; INTRAVENOUS AS NEEDED
Status: DISCONTINUED | OUTPATIENT
Start: 2024-09-11 | End: 2024-09-11

## 2024-09-11 RX ORDER — MEPERIDINE HYDROCHLORIDE 25 MG/ML
12.5 INJECTION INTRAMUSCULAR; INTRAVENOUS; SUBCUTANEOUS EVERY 10 MIN PRN
Status: DISCONTINUED | OUTPATIENT
Start: 2024-09-11 | End: 2024-09-11 | Stop reason: HOSPADM

## 2024-09-11 RX ORDER — ESMOLOL HYDROCHLORIDE 10 MG/ML
INJECTION INTRAVENOUS AS NEEDED
Status: DISCONTINUED | OUTPATIENT
Start: 2024-09-11 | End: 2024-09-11

## 2024-09-11 RX ORDER — PSEUDOEPHEDRINE HYDROCHLORIDE 60 MG/1
60 TABLET ORAL EVERY 4 HOURS PRN
COMMUNITY

## 2024-09-11 RX ORDER — ROCURONIUM BROMIDE 10 MG/ML
INJECTION, SOLUTION INTRAVENOUS AS NEEDED
Status: DISCONTINUED | OUTPATIENT
Start: 2024-09-11 | End: 2024-09-11

## 2024-09-11 RX ORDER — DEXAMETHASONE SODIUM PHOSPHATE 10 MG/ML
INJECTION INTRAMUSCULAR; INTRAVENOUS AS NEEDED
Status: DISCONTINUED | OUTPATIENT
Start: 2024-09-11 | End: 2024-09-11

## 2024-09-11 RX ORDER — LIDOCAINE HYDROCHLORIDE 10 MG/ML
INJECTION INFILTRATION; PERINEURAL AS NEEDED
Status: DISCONTINUED | OUTPATIENT
Start: 2024-09-11 | End: 2024-09-11

## 2024-09-11 RX ORDER — CYCLOBENZAPRINE HCL 10 MG
10 TABLET ORAL 3 TIMES DAILY PRN
Qty: 30 TABLET | Refills: 0 | Status: SHIPPED | OUTPATIENT
Start: 2024-09-11

## 2024-09-11 RX ORDER — LIDOCAINE HYDROCHLORIDE 10 MG/ML
0.1 INJECTION INFILTRATION; PERINEURAL ONCE
Status: DISCONTINUED | OUTPATIENT
Start: 2024-09-11 | End: 2024-09-11 | Stop reason: HOSPADM

## 2024-09-11 RX ORDER — ONDANSETRON HYDROCHLORIDE 8 MG/1
8 TABLET, FILM COATED ORAL EVERY 8 HOURS PRN
Qty: 20 TABLET | Refills: 0 | Status: SHIPPED | OUTPATIENT
Start: 2024-09-11

## 2024-09-11 ASSESSMENT — COLUMBIA-SUICIDE SEVERITY RATING SCALE - C-SSRS
6. HAVE YOU EVER DONE ANYTHING, STARTED TO DO ANYTHING, OR PREPARED TO DO ANYTHING TO END YOUR LIFE?: NO
1. IN THE PAST MONTH, HAVE YOU WISHED YOU WERE DEAD OR WISHED YOU COULD GO TO SLEEP AND NOT WAKE UP?: NO
2. HAVE YOU ACTUALLY HAD ANY THOUGHTS OF KILLING YOURSELF?: NO

## 2024-09-11 ASSESSMENT — PAIN - FUNCTIONAL ASSESSMENT
PAIN_FUNCTIONAL_ASSESSMENT: 0-10

## 2024-09-11 ASSESSMENT — ENCOUNTER SYMPTOMS
CARDIOVASCULAR NEGATIVE: 1
RESPIRATORY NEGATIVE: 1
CONSTITUTIONAL NEGATIVE: 1

## 2024-09-11 ASSESSMENT — PAIN SCALES - GENERAL
PAINLEVEL_OUTOF10: 2
PAINLEVEL_OUTOF10: 5 - MODERATE PAIN
PAINLEVEL_OUTOF10: 5 - MODERATE PAIN
PAINLEVEL_OUTOF10: 2
PAINLEVEL_OUTOF10: 3

## 2024-09-11 ASSESSMENT — PAIN DESCRIPTION - ORIENTATION: ORIENTATION: RIGHT

## 2024-09-11 ASSESSMENT — PAIN DESCRIPTION - DESCRIPTORS: DESCRIPTORS: ACHING;THROBBING;DULL

## 2024-09-11 ASSESSMENT — PAIN DESCRIPTION - LOCATION: LOCATION: ANKLE

## 2024-09-11 NOTE — H&P
History Of Present Illness  Patient Loyd Rojas is a 64 y.o. male with h/o right Achilles tendon repair with Rosa's resection in 2023 presenting for c/o right heel pain. He has had this pain for some time and it has not improved despite steroid taper, NSAIDs, CAM boot with heel lifts. Denies constitutional symptoms nausea, vomiting, fever, chills, chest pain, deep calf pain, shortness of breath. Has been NPO since midnight.     Past Medical History  Past Medical History:   Diagnosis Date    Arthritis     Cough variant asthma ([facility]) 09/19/2014    Cough variant asthma    Deficiency of other specified B group vitamins     Vitamin B12 deficiency    Other conditions influencing health status 11/20/2017    History of cough    Personal history of other diseases of the musculoskeletal system and connective tissue 01/02/2014    History of low back pain    Personal history of other diseases of the respiratory system 05/19/2018    History of acute sinusitis    Personal history of other diseases of the respiratory system 06/22/2017    History of acute sinusitis    Personal history of other diseases of the respiratory system 09/19/2014    History of acute bronchitis with bronchospasm    Personal history of other specified conditions 06/22/2017    History of nasal congestion    Personal history of other specified conditions 06/22/2017    History of diarrhea    Personal history of other specified conditions 03/04/2016    History of headache       Surgical History  Past Surgical History:   Procedure Laterality Date    ACHILLES TENDON SURGERY      BACK SURGERY  01/02/2014    back injections    OTHER SURGICAL HISTORY  01/02/2014    Facial Surgery    TONSILLECTOMY  01/02/2014    Tonsillectomy        Social History  He reports that he has quit smoking. His smoking use included cigarettes. He has never used smokeless tobacco. He reports current alcohol use. He reports current drug use. Frequency: 7.00 times per  "week.    Family History  Family History   Problem Relation Name Age of Onset    Hypertension Mother      Dementia Father      Hypertension Brother      Kidney cancer Mother's Brother      Heart attack Maternal Grandfather      Dementia Other Family history     Skin cancer Other Family history     Other (stromal sarcoma of the stomach) Other Family history     Allergies Other Family history     Hearing loss Other Family history         Allergies  Latex, natural rubber    Review of Systems   Constitutional: Negative.    Respiratory: Negative.     Cardiovascular: Negative.         Physical Exam  Constitutional:       Appearance: Normal appearance.   Eyes:      Extraocular Movements: Extraocular movements intact.      Pupils: Pupils are equal, round, and reactive to light.   Cardiovascular:      Rate and Rhythm: Normal rate and regular rhythm.   Pulmonary:      Effort: Pulmonary effort is normal.   Skin:     General: Skin is warm.      Capillary Refill: Capillary refill takes less than 2 seconds.   Neurological:      General: No focal deficit present.      Mental Status: He is alert.          Last Recorded Vitals  Blood pressure (!) 133/91, pulse 61, temperature 36.3 °C (97.3 °F), temperature source Temporal, resp. rate 17, height 1.778 m (5' 10\"), weight 79.4 kg (175 lb), SpO2 100%.    Relevant Results    No results found for this or any previous visit (from the past 24 hour(s)).      Assessment/Plan   Assessment & Plan      #Pain in right heel  #Stress reaction right calcaneus  #Achilles tendinitis RLE  #Equinus deformity RLE     Plan:  -Pt seen and evaluated. All questions answered, all findings discussed.  -Reviewed risks and benefits of surgery. Patient understanding and agreeable  -Calcaneal Spur Osteotomy: 30949 (CPT®)  Recession Gastrocnemius: 36623 (CPT®)  -CAM boot with heel lifts.  -Plan discussed with Dr. Kern and Dr. Pierce.    Yoan Persaud, DPM/PGY-3    "

## 2024-09-11 NOTE — ANESTHESIA PROCEDURE NOTES
Airway  Date/Time: 9/11/2024 1:07 PM  Urgency: elective    Airway not difficult    Staffing  Performed: ROGE   Authorized by: Arya Byrnes DO    Performed by: ROGE Donohue  Patient location during procedure: OR    Indications and Patient Condition  Indications for airway management: anesthesia  Spontaneous ventilation: present  Sedation level: deep  Preoxygenated: yes  Patient position: sniffing  MILS not maintained throughout  Mask difficulty assessment: 1 - vent by mask    Final Airway Details  Final airway type: endotracheal airway      Successful airway: ETT  Cuffed: yes   Successful intubation technique: direct laryngoscopy  Facilitating devices/methods: intubating stylet  Endotracheal tube insertion site: oral  Blade: Maribel  Blade size: #3  ETT size (mm): 7.0  Cormack-Lehane Classification: grade I - full view of glottis  Placement verified by: capnometry   Cuff volume (mL): 9  Measured from: lips  ETT to lips (cm): 23  Number of attempts at approach: 1

## 2024-09-11 NOTE — DISCHARGE INSTRUCTIONS
PODIATRIC SURGERY POST-OP INSTRUCTIONS    YOU HAD ANESTHESIA:  You must have a responsible adult drive you home and stay with you for the first 24 hours.    Do not drive a car or drink any alcohol for 24 hours after surgery.  Do not do any strenuous work or activity for the next 24 hours.  You may have mild nausea, a sore throat or raspy voice for a few days.        POST-OP INSTRUCTIONS:  Keep dressing clean, dry and intact until your first post-operative appointment.  Do not remove surgical dressing. You may need to loosen if necessary.   Do not shower unless using a cast protector to protect dressing.  If dressing gets wet, please call office immediately as this can lead to increased risk of infection or wound dehiscence.   Elevate surgical extremity as much as possible to help with pain and swelling.  Place ice pack behind knee of surgical extremity (20 minutes on/20 minutes off while awake). After 24 hours use ice behind the knee as needed for comfort.  Weight Bearing Status: Weightbearing as tolerated in CAM boot with 2 wedges  Please resume all home medications.   Post-Operative Medications: You were prescribed oxycodone for pain; please take as directed on the label. Also provided flexeril to be taken especially at night. Zofran for the first few days can be taken in event of nausea due to anesthesia  Post-operative appointment with Dr. Kern for 1 week. Please call to schedule if not already scheduled.  Should any problems, questions, or concerns arise, please call the clinic office.    WHEN TO CALL YOUR DOCTOR:  Fever (>100.4°F or 38.0°C) or chills.  Incision problems such as redness, warmth, swelling, or foul smelling drainage.  Severe nausea or persistent vomiting.  Pain and swelling in your legs, especially if it is only on one side and not the other.  Pain with urination, cloudy urine, or foul smelling urine.  Or if you have any other problems or questions.  CALL 911 or go to the ED if you have any  shortness of breath, difficulty breathing, or chest pain.

## 2024-09-11 NOTE — BRIEF OP NOTE
Date: 2024  OR Location: [facility] OR    Name: Loyd Rojas, : 1960, Age: 64 y.o., MRN: [id number], Sex: male    Diagnosis  Pre-op Diagnosis      * Achilles tendon contracture, right [M67.01]     * Right foot pain [M79.671]     * Tendonitis, Achilles, right [M76.61]     * Difficulty walking [R26.2] Post-op Diagnosis     * Achilles tendon contracture, right [M67.01]     * Right foot pain [M79.671]     * Tendonitis, Achilles, right [M76.61]     * Difficulty walking [R26.2]     Procedures  Calcaneal Spur Osteotomy  60968 - [place] OSTECTOMY CALCANEUS    Recession Gastrocnemius  80725 - [place] GASTROCNEMIUS RECESSION      Surgeons      * Antwon Kern - Primary    Resident/Fellow/Other Assistant:  Surgeons and Role:     * Leann Pierce, JUANM - Fellow  Flako Garibay, ALICE/PY-2, Yoan Persaud DPM/PGY-3  Procedure Summary  Anesthesia: General  ASA: II  Anesthesia Staff: Anesthesiologist: Arya Byrnes DO  CRNA: KAILASH Whitman-CRNA  C-AA: ROGE Donohue  Estimated Blood Loss: 5 mL  Intra-op Medications:   Administrations occurring from 1230 to 1400 on 24:   Medication Name Total Dose   BUPivacaine HCl (Marcaine) 0.5 % (5 mg/mL) injection 15 mL   lactated Ringer's infusion Cannot be calculated   ceFAZolin (Ancef) 2 g in dextrose (iso)  mL 2 g              Anesthesia Record               Intraprocedure I/O Totals          Intake    lactated Ringer's infusion 1000.00 mL    Total Intake 1000 mL          Specimen: No specimens collected     Staff:   Scrub Person: Paras Ventura Circulator: Paul  Circulator: Kory          Findings: CONSISTENT WITH PREOP DX    Complications:  None; patient tolerated the procedure well.     Disposition: PACU - hemodynamically stable.  Condition: stable  Specimens Collected: No specimens collected  Attending Attestation:     Antwon Kern  Phone Number: [phone]

## 2024-09-11 NOTE — ANESTHESIA PREPROCEDURE EVALUATION
Patient: Loyd Rojas    Procedure Information       Date/Time: 09/11/24 1230    Procedures:       Calcaneal Spur Osteotomy (Right: Foot)      Recession Gastrocnemius (Right: Foot) - C-ARM  *AOA*    Location: [facility] / [facility]    Surgeons: Antwon Kern DPM            Relevant Problems   Anesthesia (within normal limits)      Pulmonary   (+) SOB (shortness of breath) on exertion      Neuro   (+) Anxiety disorder      GI   (+) Chronic diarrhea   (+) GERD (gastroesophageal reflux disease)      /Renal   (+) BPH with obstruction/lower urinary tract symptoms      HEENT   (+) Bilateral sensorineural hearing loss   (+) Chronic recurrent sinusitis   (+) Sinus pressure      ID   (+) Atypical pneumonia       Clinical information reviewed:   Tobacco  Allergies  Meds  Problems  Med Hx  Surg Hx   Fam Hx  Soc   Hx        NPO Detail:  NPO/Void Status  Carbohydrate Drink Given Prior to Surgery? : N  Date of Last Liquid: 09/11/24  Time of Last Liquid: 0001  Date of Last Solid: 09/10/24  Time of Last Solid: 2300  Last Intake Type: Clear fluids  Time of Last Void: 1109         Physical Exam    Airway  Mallampati: II  TM distance: >3 FB     Cardiovascular    Dental    Pulmonary    Abdominal            Anesthesia Plan    History of general anesthesia?: yes  History of complications of general anesthesia?: no    ASA 2     general     intravenous induction   Anesthetic plan and risks discussed with patient.

## 2024-09-11 NOTE — POST-PROCEDURE NOTE
Call to Yoan Persaud DPM & Dr Kern for dischagre orders. Pt weight bearing as tolerated with surgical boot, Keep dressing clean & dry. Follow up in 1 week. Pt tolerated snack. Voided 250 ml urine.

## 2024-09-11 NOTE — ANESTHESIA POSTPROCEDURE EVALUATION
Patient: Loyd Rojas    Procedure Summary       Date: 09/11/24 Room / Location: [facility]    Anesthesia Start: 1258 Anesthesia Stop: 1439    Procedures:       Calcaneal Spur Osteotomy (Right: Foot)      Recession Gastrocnemius (Right: Foot) Diagnosis:       Achilles tendon contracture, right      Right foot pain      Tendonitis, Achilles, right      Difficulty walking      (Short achilles tendon acquired, right ankle. Pain in right food. Achilles tendonitis, right. Difficultly walking.)    Surgeons: Antwon Kern DPM Responsible Provider: Arya Byrnes DO    Anesthesia Type: general ASA Status: 2            Anesthesia Type: general    Vitals Value Taken Time   /101 09/11/24 1456   Temp 36.3 °C (97.3 °F) 09/11/24 1435   Pulse 70 09/11/24 1457   Resp 17 09/11/24 1457   SpO2 95 % 09/11/24 1457   Vitals shown include unfiled device data.    Anesthesia Post Evaluation    Patient location during evaluation: bedside  Patient participation: complete - patient participated  Level of consciousness: awake  Pain management: adequate  Airway patency: patent  Cardiovascular status: acceptable  Respiratory status: acceptable  Hydration status: acceptable  Postoperative Nausea and Vomiting: none        There were no known notable events for this encounter.

## 2024-09-11 NOTE — POST-PROCEDURE NOTE
Pt dressed, right foot dressing dry & intact. Toes warm to touch with good capillary refill. Boot on. Transport put in computer- spouse getting car. Pt voided 275ml clear urine.

## 2024-09-12 NOTE — OP NOTE
PODIATRIC OPERATIVE REPORT    LOG ID: [id number]  SURGERY/PROCEDURE DATE: 9/11/2024  OR LOCATION: [facility]    SURGEON(S)/PROCEDURALIST(S) AND ASSISTANT(S):  Primary: Antwon Kern DPM  Fellow: Leann Pierce DPM    OTHER OR STAFF:  Circulator: Kory Coyne RN  Relief Circulator: Paul Lucas RN  Scrub Person: Paras Garay    SURGERY/PROCEDURE(S):  (1) Scar revision 80184  (2) Gastrocnemius recession 27687  (3) Calcaneal Spur Osteotomy 33115  (4) Secondary Repair of Achilles tendon 22004  (5) Use of intra-operative fluoroscopy 81810    Recession Gastrocnemius  19173 - [place] GASTROCNEMIUS RECESSION      PRE-OP/PRE-PROCEDURE DIAGNOSIS:   Pre-op Diagnosis      * Achilles tendon contracture, right [M67.01]     * Right foot pain [M79.671]     * Tendonitis, Achilles, right [M76.61]     * Difficulty walking [R26.2]     * Scar condition and fibrosis of skin [L90.5]    POST-OP/POST-PROCEDURE DIAGNOSIS: Same as Pre-Op    ANESTHESIA:   Anesthesia: Consult  ASA: II  Anesthesia Staff: Anesthesiologist: Arya Byrnes DO  CRNA: LUCHO Whitman  C-AA: ROGE Donohue  Intra-op Medications:   Administrations occurring from 1230 to 1400 on 09/11/24:   Medication Name Total Dose   BUPivacaine HCl (Marcaine) 0.5 % (5 mg/mL) injection 15 mL   ceFAZolin (Ancef) 2 g in dextrose (iso)  mL 2 g   lactated Ringer's infusion Cannot be calculated       ESTIMATED BLOOD LOSS: Minimal    SPECIMENS: No specimens collected during this procedure.    IMPLANTABLE DEVICES:  Implant Name Type Inv. Item Serial No.  Lot No. LRB No. Used Action   IMPLANT SYSTEM, BIO-COMP, ACHILLES, MID-SUBSTANCE - [id number] Implant IMPLANT SYSTEM, BIO-COMP, ACHILLES, MID-SUBSTANCE  [employer] [id number] Right 1 Implanted   amniotic barrier membrane   [id number] Blastbeat  Right 1 Implanted       FINDINGS: Intraoperative findings consistent with clinical and radiographic findings.    DRAINS: n/a    TOURNIQUET  TIMES:   Total Tourniquet Time Documented:  Thigh (Right) - 63 minutes  Total: Thigh (Right) - 63 minutes      COMPLICATIONS: None; patient tolerated the procedure well.       SURGERY/PROCEDURE DETAILS:  INDICATIONS FOR PROCEDURE:   The patient with diagnosis as outlined above presents for podiatric surgical intervention today. The patient has attempted and failed conservative treatment as outlined in preoperative clinic notes and wishes to proceed with surgical intervention at this time. The nature of the deformity, problems anticipated procedures, recovery/convalescence, risks/complications including but not limited to numbness, CRPS, over/under correction, problems healing of soft tissue or bone, postoperative wound infection, wound dehiscence, DVT and/or persistent pain/disability have been explained to the patient in detail. An updated H&P and consent have been completed prior to today’s surgical intervention. The patient states that they have been NPO since midnight. No guarantees were given or implied, but it is expected that the patient will have a favorable outcome.  It is with this understanding that we proceed.     PRE-PROCEDURE INFORMATION:  In pre-op holding area, the  extremity to be operated on was clearly marked and the patient verified correct laterality of the marking.  The patient was brought to the operating room and placed on the operating table in the prone position.  A timeout was performed in which identification of the correct patient, procedure, location, and materials was done. A pneumatic thigh tourniquet was placed on the patient's right thigh. Following IV sedation, local anesthesia was obtained utilizing 15 cc of 0.5% marcaine. The right foot was then scrubbed, prepped and draped in the usual aseptic manner. An Esmarch bandage was then utilized to exsanguinate the patient's right leg and foot and the tourniquet was inflated to 300 mmHg.    DESCRIPTION OF PROCEDURE:     Procedure  1:    Attention was directed to the left foot where a raised surgical scar noted overlying posterior Achilles tendon. Utilizing a 3:1 ratio with semi-elliptical incision, the area was incised utilizing deep and sharp tissue excision the resultant dystrophic skin wedge and scar was excised in total from the surgical site. Excised scar measurements 6.0 cm x 2.0 cm x 0.2cm.     Procedure 2:    Attention was directed to the right leg where a linear incision was made through the skin parallel with the leg at the level of the distal muscle belly of the medial head of the gastrocnemius muscle. The incision was deepened to the level of the deep fascia using blunt dissection. Care was taken throughout dissection to avoid damage to the neurovascular and tendinous structures. Hemostasis was achieved via electrocautery. The deep fascia and paratenon were identified and sharply incised. The aponeurosis was then identified and a #15 blade was used to sharply incise through the aponeurosis of the gastrocnemius muscle; it was ensured that the muscle belly was not cut as the transection was made. All fibers were cut and it was verified manually. It was noted that clinically there was an increase in dorsiflexion.     Procedure 3:    A 6 cm longitudinal incision was then made Achilles tendon from its insertion proximal. Careful dissection was then taken down to the level of the peritenon. Once this was reached, full thickness flaps were performed medially and laterally. Next, retractor was placed. All neurovascular structures were protected. A reverse T-shaped incision was then made in the peritenon and opened up exposing the tendon. There was noted to be complete rupture of the tendon approximately 4 cm proximal to the insertion point, allowing the tendon to be opened outward like a book to reveal underlying calcaneal spur. At this time, a sagittal saw as well as a rongeur were used to remove all spurring from calcaneus.      Procedure 4:    The tendon was debrided at this time of intratendinous calcifications a as well as frayed tendon. Most of the ankle appeared that there was sufficient tendon links in order to do a primary repair. Prepare the bone for insertion of the two 4.75 mm BioComposite SwiveLock® anchors. Created two holes about 1 cm proximal to the distal insertion of the Achilles tendon and central to each half of the tendon. Use the 4.75 mm SwiveLock® tap to prepare the holes for the 4.75 mm SwiveLock anchors. Inserted the two 4.75 mm BioComposite SwiveLock anchors loaded with FiberTape® sutures into the proximal holes. Placed the eyelet completely in the drill hole until the anchor  body makes contact with the bone. Passed the needle attached to the 2 mm FiberTape suture through the Achilles tendon on each side. Prepared the distal holes with the  3.5 mm drill in the same manner as the proximal holes. Tap the distal holes in preparation for the 4.75 mm SwiveLock® anchors much as above. Adjusted the tension of the FiberTape suture and insert the 4.75 mm SwiveLock anchor into the prepared distal bone socket until the anchor body contacted bone. Cut all tails    An amniotic graft was then placed between tendon and underlying bone to augment repair. Next #0 PDS on a taper needle was selected and a Krackow stitch was then performed. Two sutures were then used and tied individually 1 cm distal to the tendon. The tendon came together very well and with a tight connection. Next, a #2-0 Vicryl suture was then used to close the peritenon over the Achilles tendon.     Procedure 5:    Fluoroscopy was used throughout the entire procedure to aid in identification of osseous landmarks, adequate deformity reduction visualization and proper placement of all hardware. All staff members, although protected with certified lead, were exposed to excess radiation throughout the procedure.      The wound was once again copiously irrigated and  dried. A #2-0 Vicryl sutures were then used to close the skin and subcutaneous fashion followed by #4-0 suture in the subcuticular closure on the skin with staples superficially. Dressing was applied consisting of 4x4s, Kerlix roll, sterile Kerlix and ACE wrap    POSTOPERATIVE INFORMATION: The patient tolerated the above noted procedure and anesthesia well and was transferred to the PACU with vital signs stable, and vascular status intact with capillary refill intact to the most distal aspect of the operative extremity. Postoperative instructions reviewed in detail with the patient and family with written instructions provided. Patient will return to floor. Should any problems, questions, or concerns arise, primary team to beth or [facility]ry team.    This is Yoan Persaud DPM dictating the operative note for Dr. Pierce.      Attending Attestation:       SIGNATURE: Yoan Persaud DPM PATIENT NAME: Loyd Rojas   DATE: September 11, 2024 MRN: [id number]   TIME: 11:03 PM

## 2024-10-07 ENCOUNTER — APPOINTMENT (OUTPATIENT)
Dept: RADIOLOGY | Facility: HOSPITAL | Age: 64
End: 2024-10-07
Payer: MEDICARE

## 2024-10-07 ENCOUNTER — APPOINTMENT (OUTPATIENT)
Dept: CARDIOLOGY | Facility: HOSPITAL | Age: 64
End: 2024-10-07
Payer: MEDICARE

## 2024-10-07 ENCOUNTER — TELEPHONE (OUTPATIENT)
Dept: PRIMARY CARE | Facility: CLINIC | Age: 64
End: 2024-10-07
Payer: MEDICARE

## 2024-10-07 ENCOUNTER — HOSPITAL ENCOUNTER (EMERGENCY)
Facility: HOSPITAL | Age: 64
Discharge: HOME | End: 2024-10-07
Attending: STUDENT IN AN ORGANIZED HEALTH CARE EDUCATION/TRAINING PROGRAM
Payer: MEDICARE

## 2024-10-07 VITALS
OXYGEN SATURATION: 94 % | BODY MASS INDEX: 23.07 KG/M2 | HEART RATE: 71 BPM | HEIGHT: 70 IN | WEIGHT: 161.16 LBS | DIASTOLIC BLOOD PRESSURE: 101 MMHG | SYSTOLIC BLOOD PRESSURE: 143 MMHG | TEMPERATURE: 97.5 F | RESPIRATION RATE: 17 BRPM

## 2024-10-07 DIAGNOSIS — R06.02 SHORTNESS OF BREATH: Primary | ICD-10-CM

## 2024-10-07 DIAGNOSIS — R25.1 SHAKING: ICD-10-CM

## 2024-10-07 LAB
ALBUMIN SERPL BCP-MCNC: 4.4 G/DL (ref 3.4–5)
ALP SERPL-CCNC: 90 U/L (ref 33–136)
ALT SERPL W P-5'-P-CCNC: 9 U/L (ref 10–52)
ANION GAP SERPL CALCULATED.3IONS-SCNC: 17 MMOL/L (ref 10–20)
AST SERPL W P-5'-P-CCNC: 19 U/L (ref 9–39)
BASOPHILS # BLD AUTO: 0.09 X10*3/UL (ref 0–0.1)
BASOPHILS NFR BLD AUTO: 0.8 %
BILIRUB SERPL-MCNC: 0.7 MG/DL (ref 0–1.2)
BUN SERPL-MCNC: 9 MG/DL (ref 6–23)
CALCIUM SERPL-MCNC: 9.6 MG/DL (ref 8.6–10.3)
CARDIAC TROPONIN I PNL SERPL HS: 5 NG/L (ref 0–20)
CHLORIDE SERPL-SCNC: 105 MMOL/L (ref 98–107)
CO2 SERPL-SCNC: 19 MMOL/L (ref 21–32)
CREAT SERPL-MCNC: 0.84 MG/DL (ref 0.5–1.3)
D DIMER PPP FEU-MCNC: 0.47 MG/L FEU (ref 0.19–0.5)
EGFRCR SERPLBLD CKD-EPI 2021: >90 ML/MIN/1.73M*2
EOSINOPHIL # BLD AUTO: 0.04 X10*3/UL (ref 0–0.7)
EOSINOPHIL NFR BLD AUTO: 0.4 %
ERYTHROCYTE [DISTWIDTH] IN BLOOD BY AUTOMATED COUNT: 12.8 % (ref 11.5–14.5)
GLUCOSE SERPL-MCNC: 97 MG/DL (ref 74–99)
HCT VFR BLD AUTO: 46.6 % (ref 41–52)
HGB BLD-MCNC: 15.9 G/DL (ref 13.5–17.5)
HOLD SPECIMEN: NORMAL
IMM GRANULOCYTES # BLD AUTO: 0.04 X10*3/UL (ref 0–0.7)
IMM GRANULOCYTES NFR BLD AUTO: 0.4 % (ref 0–0.9)
INR PPP: 1 (ref 0.9–1.2)
LYMPHOCYTES # BLD AUTO: 1.01 X10*3/UL (ref 1.2–4.8)
LYMPHOCYTES NFR BLD AUTO: 9.3 %
MCH RBC QN AUTO: 30.1 PG (ref 26–34)
MCHC RBC AUTO-ENTMCNC: 34.1 G/DL (ref 32–36)
MCV RBC AUTO: 88 FL (ref 80–100)
MONOCYTES # BLD AUTO: 0.68 X10*3/UL (ref 0.1–1)
MONOCYTES NFR BLD AUTO: 6.3 %
NEUTROPHILS # BLD AUTO: 8.96 X10*3/UL (ref 1.2–7.7)
NEUTROPHILS NFR BLD AUTO: 82.8 %
NRBC BLD-RTO: 0 /100 WBCS (ref 0–0)
PLATELET # BLD AUTO: 354 X10*3/UL (ref 150–450)
POTASSIUM SERPL-SCNC: 4.2 MMOL/L (ref 3.5–5.3)
PROT SERPL-MCNC: 7.8 G/DL (ref 6.4–8.2)
PROTHROMBIN TIME: 10.8 SECONDS (ref 9.3–12.7)
RBC # BLD AUTO: 5.28 X10*6/UL (ref 4.5–5.9)
SODIUM SERPL-SCNC: 137 MMOL/L (ref 136–145)
WBC # BLD AUTO: 10.8 X10*3/UL (ref 4.4–11.3)

## 2024-10-07 PROCEDURE — 80053 COMPREHEN METABOLIC PANEL: CPT | Performed by: STUDENT IN AN ORGANIZED HEALTH CARE EDUCATION/TRAINING PROGRAM

## 2024-10-07 PROCEDURE — 85300 ANTITHROMBIN III ACTIVITY: CPT | Performed by: STUDENT IN AN ORGANIZED HEALTH CARE EDUCATION/TRAINING PROGRAM

## 2024-10-07 PROCEDURE — 99285 EMERGENCY DEPT VISIT HI MDM: CPT

## 2024-10-07 PROCEDURE — 71046 X-RAY EXAM CHEST 2 VIEWS: CPT

## 2024-10-07 PROCEDURE — 71046 X-RAY EXAM CHEST 2 VIEWS: CPT | Performed by: RADIOLOGY

## 2024-10-07 PROCEDURE — 36415 COLL VENOUS BLD VENIPUNCTURE: CPT | Performed by: STUDENT IN AN ORGANIZED HEALTH CARE EDUCATION/TRAINING PROGRAM

## 2024-10-07 PROCEDURE — 70450 CT HEAD/BRAIN W/O DYE: CPT

## 2024-10-07 PROCEDURE — 85025 COMPLETE CBC W/AUTO DIFF WBC: CPT | Performed by: STUDENT IN AN ORGANIZED HEALTH CARE EDUCATION/TRAINING PROGRAM

## 2024-10-07 PROCEDURE — 93005 ELECTROCARDIOGRAM TRACING: CPT

## 2024-10-07 PROCEDURE — 70450 CT HEAD/BRAIN W/O DYE: CPT | Performed by: RADIOLOGY

## 2024-10-07 PROCEDURE — 85610 PROTHROMBIN TIME: CPT | Performed by: STUDENT IN AN ORGANIZED HEALTH CARE EDUCATION/TRAINING PROGRAM

## 2024-10-07 PROCEDURE — 84484 ASSAY OF TROPONIN QUANT: CPT | Performed by: STUDENT IN AN ORGANIZED HEALTH CARE EDUCATION/TRAINING PROGRAM

## 2024-10-07 ASSESSMENT — LIFESTYLE VARIABLES
EVER FELT BAD OR GUILTY ABOUT YOUR DRINKING: NO
TOTAL SCORE: 0
EVER HAD A DRINK FIRST THING IN THE MORNING TO STEADY YOUR NERVES TO GET RID OF A HANGOVER: NO
HAVE PEOPLE ANNOYED YOU BY CRITICIZING YOUR DRINKING: NO
HAVE YOU EVER FELT YOU SHOULD CUT DOWN ON YOUR DRINKING: NO

## 2024-10-07 ASSESSMENT — PAIN SCALES - GENERAL
PAINLEVEL_OUTOF10: 0 - NO PAIN

## 2024-10-07 ASSESSMENT — COLUMBIA-SUICIDE SEVERITY RATING SCALE - C-SSRS
2. HAVE YOU ACTUALLY HAD ANY THOUGHTS OF KILLING YOURSELF?: NO
1. IN THE PAST MONTH, HAVE YOU WISHED YOU WERE DEAD OR WISHED YOU COULD GO TO SLEEP AND NOT WAKE UP?: NO
6. HAVE YOU EVER DONE ANYTHING, STARTED TO DO ANYTHING, OR PREPARED TO DO ANYTHING TO END YOUR LIFE?: NO

## 2024-10-07 ASSESSMENT — PAIN - FUNCTIONAL ASSESSMENT
PAIN_FUNCTIONAL_ASSESSMENT: 0-10
PAIN_FUNCTIONAL_ASSESSMENT: 0-10

## 2024-10-07 NOTE — DISCHARGE INSTRUCTIONS
You should follow-up with neurology, cardiology, and your primary care physician.  Return to the emergency department with any worsening symptoms.    It is important to remember that your care does not end here and you must continue to monitor your condition closely. Please return to the emergency department for any worsening or concerning signs or symptoms as directed by our conversations and the discharge instructions. If you do not have a doctor please contact the referral number on your discharge instructions. Please contact any physician specialists provided in your discharge notes as it is very important to follow up with them regarding your condition. If you are unable to reach the physicians provided, please come back to the Emergency Department at any time.    Return to emergency room without delay for ANY new or worsening pains or for any other symptoms or concerns.  Return with worsening pains, nausea, vomiting, trouble breathing, palpitations, shortness of breath, inability to pass stool or urine, loss of control of stool or urine, any numbness or tingling (that is not normal for you), uncontrolled fevers, the passing of blood or other material in stool or urine, rashes, pains or for any other symptoms or concerns you may have.  You are always welcome to return to the ER at any time for any reason or for any other concerns you may have.

## 2024-10-07 NOTE — ED PROVIDER NOTES
HPI   Chief Complaint   Patient presents with    Weakness, Gen     Patient coming in by [facility] for increasing weakness over the past couple of weeks, states noticed it may have started when he started his blood thinners but not really sure.  Patient had right achilles surgery about 4 weeks ago and went on a plane this week so they started him on thinners.  Patient also c/o shortess of breath on exertion.       Patient presents with weakness and shortness of breath.  He states that whenever he tries to exert himself, he becomes very short of breath and feels very weak.  This has been ongoing for weeks to months.  He did have recent Achilles surgery which he reports is not healing up as well as his last Achilles surgery did.  He reports that he sometimes has tremoring of his hands and there is family history of parkinsonism.  He reports that he has been somewhat forgetful and felt confused as well.              Patient History   Past Medical History:   Diagnosis Date    Arthritis     Cough variant asthma ([facility]) 09/19/2014    Cough variant asthma    Deficiency of other specified B group vitamins     Vitamin B12 deficiency    Other conditions influencing health status 11/20/2017    History of cough    Personal history of other diseases of the musculoskeletal system and connective tissue 01/02/2014    History of low back pain    Personal history of other diseases of the respiratory system 05/19/2018    History of acute sinusitis    Personal history of other diseases of the respiratory system 06/22/2017    History of acute sinusitis    Personal history of other diseases of the respiratory system 09/19/2014    History of acute bronchitis with bronchospasm    Personal history of other specified conditions 06/22/2017    History of nasal congestion    Personal history of other specified conditions 06/22/2017    History of diarrhea    Personal history of other specified conditions 03/04/2016    History of headache      Past Surgical History:   Procedure Laterality Date    ACHILLES TENDON SURGERY      BACK SURGERY  01/02/2014    back injections    OTHER SURGICAL HISTORY  01/02/2014    Facial Surgery    TONSILLECTOMY  01/02/2014    Tonsillectomy     Family History   Problem Relation Name Age of Onset    Hypertension Mother      Dementia Father      Hypertension Brother      Kidney cancer Mother's Brother      Heart attack Maternal Grandfather      Dementia Other Family history     Skin cancer Other Family history     Other (stromal sarcoma of the stomach) Other Family history     Allergies Other Family history     Hearing loss Other Family history      Social History     Tobacco Use    Smoking status: Former     Types: Cigarettes    Smokeless tobacco: Never   Vaping Use    Vaping status: Not on file   Substance Use Topics    Alcohol use: Yes    Drug use: Yes     Frequency: 7.0 times per week     Comment: THC, last use 9/11       Physical Exam   ED Triage Vitals [10/07/24 1437]   Temperature Heart Rate Respirations BP   36.4 °C (97.5 °F) 74 16 (!) 149/110      Pulse Ox Temp Source Heart Rate Source Patient Position   100 % Oral Monitor Sitting      BP Location FiO2 (%)     Right arm --       Physical Exam  HENT:      Head: Normocephalic.   Eyes:      General: No scleral icterus.  Cardiovascular:      Rate and Rhythm: Normal rate.   Pulmonary:      Effort: Pulmonary effort is normal.      Breath sounds: Normal breath sounds.   Neurological:      Mental Status: He is alert.      Comments: Strength and sensation equal and intact in bilateral upper and lower extremities.           ED Course & MDM   ED Course as of 10/07/24 1829   Mon Oct 07, 2024   1448 EKG interpreted by me: Normal sinus rhythm, rate 76.  Normal axis.  No ST or T wave abnormalities. [ML]      ED Course User Index  [ML] Shant Menjivar MD         Diagnoses as of 10/07/24 1829   Shortness of breath   Shaking                 No data recorded     Miguel Coma Scale  Score: 15 (10/07/24 1439 : Riddhi Cervantes RN)                           Medical Decision Making  Laboratory studies unremarkable.  Chest x-ray without acute findings.  Head CT does not reveal any acute findings.  Patient given referral to follow-up with cardiologist, neurology, and was advised to follow-up with primary care physician.  Return precautions given for any worsening symptoms.  Parts of this chart were completed with dictation software, please excuse any errors in transcription.        Procedure  Procedures     Shant Menjivar MD  10/07/24 6137

## 2024-10-11 ENCOUNTER — PATIENT OUTREACH (OUTPATIENT)
Dept: CARE COORDINATION | Facility: CLINIC | Age: 64
End: 2024-10-11
Payer: MEDICARE

## 2024-10-12 LAB
ATRIAL RATE: 76 BPM
P AXIS: 76 DEGREES
P OFFSET: 188 MS
P ONSET: 133 MS
PR INTERVAL: 168 MS
Q ONSET: 217 MS
QRS COUNT: 12 BEATS
QRS DURATION: 96 MS
QT INTERVAL: 406 MS
QTC CALCULATION(BAZETT): 456 MS
QTC FREDERICIA: 439 MS
R AXIS: 69 DEGREES
T AXIS: 58 DEGREES
T OFFSET: 420 MS
VENTRICULAR RATE: 76 BPM

## 2024-10-29 ENCOUNTER — APPOINTMENT (OUTPATIENT)
Dept: OTOLARYNGOLOGY | Facility: CLINIC | Age: 64
End: 2024-10-29
Payer: MEDICARE

## 2024-10-29 ENCOUNTER — OFFICE VISIT (OUTPATIENT)
Dept: CARDIOLOGY | Facility: CLINIC | Age: 64
End: 2024-10-29
Payer: MEDICARE

## 2024-10-29 ENCOUNTER — ANCILLARY PROCEDURE (OUTPATIENT)
Dept: CARDIOLOGY | Facility: CLINIC | Age: 64
End: 2024-10-29
Payer: MEDICARE

## 2024-10-29 VITALS
DIASTOLIC BLOOD PRESSURE: 64 MMHG | HEIGHT: 70 IN | OXYGEN SATURATION: 98 % | HEART RATE: 80 BPM | WEIGHT: 180 LBS | BODY MASS INDEX: 25.77 KG/M2 | SYSTOLIC BLOOD PRESSURE: 136 MMHG

## 2024-10-29 VITALS — BODY MASS INDEX: 25.75 KG/M2 | HEIGHT: 70 IN | WEIGHT: 179.9 LBS

## 2024-10-29 DIAGNOSIS — J34.2 DEVIATED NASAL SEPTUM: ICD-10-CM

## 2024-10-29 DIAGNOSIS — J30.89 NON-SEASONAL ALLERGIC RHINITIS DUE TO OTHER ALLERGIC TRIGGER: Primary | ICD-10-CM

## 2024-10-29 DIAGNOSIS — J32.0 CHRONIC MAXILLARY SINUSITIS: ICD-10-CM

## 2024-10-29 DIAGNOSIS — J34.3 HYPERTROPHY OF NASAL TURBINATES: ICD-10-CM

## 2024-10-29 DIAGNOSIS — R03.0 ELEVATED BP WITHOUT DIAGNOSIS OF HYPERTENSION: ICD-10-CM

## 2024-10-29 DIAGNOSIS — R09.81 CHRONIC NASAL CONGESTION: ICD-10-CM

## 2024-10-29 DIAGNOSIS — R06.02 SOB (SHORTNESS OF BREATH) ON EXERTION: Primary | ICD-10-CM

## 2024-10-29 DIAGNOSIS — R06.02 SHORTNESS OF BREATH: ICD-10-CM

## 2024-10-29 PROCEDURE — 99213 OFFICE O/P EST LOW 20 MIN: CPT | Performed by: OTOLARYNGOLOGY

## 2024-10-29 PROCEDURE — 3008F BODY MASS INDEX DOCD: CPT | Performed by: INTERNAL MEDICINE

## 2024-10-29 PROCEDURE — 1036F TOBACCO NON-USER: CPT | Performed by: OTOLARYNGOLOGY

## 2024-10-29 PROCEDURE — 1159F MED LIST DOCD IN RCRD: CPT | Performed by: OTOLARYNGOLOGY

## 2024-10-29 PROCEDURE — G2211 COMPLEX E/M VISIT ADD ON: HCPCS | Performed by: INTERNAL MEDICINE

## 2024-10-29 PROCEDURE — 1159F MED LIST DOCD IN RCRD: CPT | Performed by: INTERNAL MEDICINE

## 2024-10-29 PROCEDURE — 3008F BODY MASS INDEX DOCD: CPT | Performed by: OTOLARYNGOLOGY

## 2024-10-29 PROCEDURE — 1160F RVW MEDS BY RX/DR IN RCRD: CPT | Performed by: OTOLARYNGOLOGY

## 2024-10-29 PROCEDURE — 1126F AMNT PAIN NOTED NONE PRSNT: CPT | Performed by: INTERNAL MEDICINE

## 2024-10-29 PROCEDURE — 1036F TOBACCO NON-USER: CPT | Performed by: INTERNAL MEDICINE

## 2024-10-29 PROCEDURE — 93005 ELECTROCARDIOGRAM TRACING: CPT

## 2024-10-29 PROCEDURE — 93005 ELECTROCARDIOGRAM TRACING: CPT | Performed by: INTERNAL MEDICINE

## 2024-10-29 PROCEDURE — 99213 OFFICE O/P EST LOW 20 MIN: CPT | Performed by: INTERNAL MEDICINE

## 2024-10-29 PROCEDURE — 99203 OFFICE O/P NEW LOW 30 MIN: CPT | Performed by: INTERNAL MEDICINE

## 2024-10-29 RX ORDER — FLUTICASONE PROPIONATE 50 MCG
2 SPRAY, SUSPENSION (ML) NASAL DAILY
Qty: 48 ML | Refills: 0 | Status: SHIPPED | OUTPATIENT
Start: 2024-10-29

## 2024-10-29 RX ORDER — BISOPROLOL FUMARATE 5 MG/1
5 TABLET, FILM COATED ORAL DAILY
Qty: 30 TABLET | Refills: 11 | Status: SHIPPED | OUTPATIENT
Start: 2024-10-29 | End: 2025-10-29

## 2024-10-29 RX ORDER — AZELASTINE 1 MG/ML
2 SPRAY, METERED NASAL 2 TIMES DAILY
Qty: 90 ML | Refills: 0 | Status: SHIPPED | OUTPATIENT
Start: 2024-10-29

## 2024-10-29 RX ORDER — APIXABAN 2.5 MG/1
2.5 TABLET, FILM COATED ORAL 2 TIMES DAILY
COMMUNITY
Start: 2024-09-25 | End: 2024-10-30 | Stop reason: WASHOUT

## 2024-10-29 ASSESSMENT — PAIN SCALES - GENERAL: PAINLEVEL_OUTOF10: 0-NO PAIN

## 2024-10-30 ENCOUNTER — APPOINTMENT (OUTPATIENT)
Dept: NEUROLOGY | Facility: CLINIC | Age: 64
End: 2024-10-30
Payer: MEDICARE

## 2024-10-30 VITALS
DIASTOLIC BLOOD PRESSURE: 88 MMHG | WEIGHT: 180.5 LBS | HEIGHT: 70 IN | BODY MASS INDEX: 25.84 KG/M2 | SYSTOLIC BLOOD PRESSURE: 114 MMHG | HEART RATE: 79 BPM | TEMPERATURE: 97.3 F

## 2024-10-30 DIAGNOSIS — R25.1 SHAKING: ICD-10-CM

## 2024-10-30 DIAGNOSIS — G43.709 CHRONIC MIGRAINE WITHOUT AURA WITHOUT STATUS MIGRAINOSUS, NOT INTRACTABLE: Primary | ICD-10-CM

## 2024-10-30 PROCEDURE — 1159F MED LIST DOCD IN RCRD: CPT | Performed by: PSYCHIATRY & NEUROLOGY

## 2024-10-30 PROCEDURE — 99204 OFFICE O/P NEW MOD 45 MIN: CPT | Performed by: PSYCHIATRY & NEUROLOGY

## 2024-10-30 PROCEDURE — G2211 COMPLEX E/M VISIT ADD ON: HCPCS | Performed by: PSYCHIATRY & NEUROLOGY

## 2024-10-30 PROCEDURE — 3008F BODY MASS INDEX DOCD: CPT | Performed by: PSYCHIATRY & NEUROLOGY

## 2024-10-30 RX ORDER — TOPIRAMATE 50 MG/1
TABLET, FILM COATED ORAL
Qty: 180 TABLET | Refills: 2 | Status: SHIPPED | OUTPATIENT
Start: 2024-10-30

## 2024-10-30 ASSESSMENT — PATIENT HEALTH QUESTIONNAIRE - PHQ9
2. FEELING DOWN, DEPRESSED OR HOPELESS: NOT AT ALL
1. LITTLE INTEREST OR PLEASURE IN DOING THINGS: NOT AT ALL
SUM OF ALL RESPONSES TO PHQ9 QUESTIONS 1 & 2: 0

## 2024-10-30 ASSESSMENT — LIFESTYLE VARIABLES
HOW OFTEN DO YOU HAVE SIX OR MORE DRINKS ON ONE OCCASION: NEVER
HOW OFTEN DO YOU HAVE A DRINK CONTAINING ALCOHOL: NEVER
HOW MANY STANDARD DRINKS CONTAINING ALCOHOL DO YOU HAVE ON A TYPICAL DAY: PATIENT DOES NOT DRINK
SKIP TO QUESTIONS 9-10: 1
AUDIT-C TOTAL SCORE: 0

## 2024-11-04 DIAGNOSIS — G43.709 CHRONIC MIGRAINE WITHOUT AURA WITHOUT STATUS MIGRAINOSUS, NOT INTRACTABLE: ICD-10-CM

## 2024-11-04 RX ORDER — TOPIRAMATE 50 MG/1
TABLET, FILM COATED ORAL
Qty: 180 TABLET | Refills: 2 | Status: SHIPPED | OUTPATIENT
Start: 2024-11-04

## 2024-11-06 ENCOUNTER — PATIENT OUTREACH (OUTPATIENT)
Dept: CARE COORDINATION | Facility: CLINIC | Age: 64
End: 2024-11-06
Payer: MEDICARE

## 2024-11-13 LAB
ATRIAL RATE: 80 BPM
P OFFSET: 185 MS
P ONSET: 135 MS
PR INTERVAL: 162 MS
Q ONSET: 216 MS
QRS COUNT: 14 BEATS
QRS DURATION: 92 MS
QT INTERVAL: 376 MS
QTC CALCULATION(BAZETT): 433 MS
QTC FREDERICIA: 414 MS
R AXIS: 84 DEGREES
T AXIS: 87 DEGREES
T OFFSET: 404 MS
VENTRICULAR RATE: 80 BPM

## 2024-11-14 ENCOUNTER — PATIENT OUTREACH (OUTPATIENT)
Dept: CARE COORDINATION | Facility: CLINIC | Age: 64
End: 2024-11-14
Payer: MEDICARE

## 2024-11-26 DIAGNOSIS — F41.9 ANXIETY DISORDER, UNSPECIFIED: ICD-10-CM

## 2024-11-26 RX ORDER — OMEPRAZOLE 40 MG/1
40 CAPSULE, DELAYED RELEASE ORAL DAILY
Qty: 90 CAPSULE | Refills: 1 | Status: SHIPPED | OUTPATIENT
Start: 2024-11-26

## 2024-12-21 ENCOUNTER — APPOINTMENT (OUTPATIENT)
Dept: OTOLARYNGOLOGY | Facility: CLINIC | Age: 64
End: 2024-12-21
Payer: MEDICARE

## 2025-01-02 ENCOUNTER — TELEPHONE (OUTPATIENT)
Dept: PRIMARY CARE | Facility: CLINIC | Age: 65
End: 2025-01-02

## 2025-01-02 ENCOUNTER — TELEPHONE (OUTPATIENT)
Dept: NEUROLOGY | Facility: CLINIC | Age: 65
End: 2025-01-02
Payer: MEDICARE

## 2025-01-02 ENCOUNTER — TELEPHONE (OUTPATIENT)
Dept: CARDIOLOGY | Facility: CLINIC | Age: 65
End: 2025-01-02
Payer: MEDICARE

## 2025-01-02 DIAGNOSIS — I10 HYPERTENSION, UNSPECIFIED TYPE: Primary | ICD-10-CM

## 2025-01-02 RX ORDER — METOPROLOL SUCCINATE 25 MG/1
25 TABLET, EXTENDED RELEASE ORAL DAILY
Qty: 30 TABLET | Refills: 5 | Status: SHIPPED | OUTPATIENT
Start: 2025-01-02 | End: 2025-07-01

## 2025-01-02 NOTE — TELEPHONE ENCOUNTER
It's unlikely to be a side effect from Topamax.  However, he could cut his Topamax to 25 mg BID to see if it helps.

## 2025-01-02 NOTE — TELEPHONE ENCOUNTER
I spoke with pt. He is going to follow up with the provider that prescibed the other med. He will let us know if anything else changes.

## 2025-01-02 NOTE — TELEPHONE ENCOUNTER
Pt has been experiencing dry mouth, dry skin on his arms which is progressively getting worse. Feels like he just wants to rack him arms off has been living on antihistamine for month now. Hasn't had a headache since he was put him on the topamax but isn't sure if the side effects are from the topamax or the zebata he was also put on recently. He would like to know what to do.

## 2025-01-02 NOTE — TELEPHONE ENCOUNTER
Pt called stating that he has been itching and breaking out in hives since the end of October/beginning of November. Pt stated that Dr Bailey had started him on Bisoprolol 5 mg daily on 10/29/24. Pt was also started on Topiramate 50 mg BID on 11/4/24. Pt not sure which medication is causing the itching and hives. Pt has not been checking his BP or HR at home, does not have any readings to report and stated that the last time his BP was checked was probably when he was here at his last in office appointment. This nurse informed pt that Dr Bailey is out of the office until 1/10/25 and instructed pt to call his PCP. Pt verbalized understanding.

## 2025-01-10 ENCOUNTER — APPOINTMENT (OUTPATIENT)
Dept: NEUROLOGY | Facility: HOSPITAL | Age: 65
End: 2025-01-10
Payer: MEDICARE

## 2025-01-22 ENCOUNTER — APPOINTMENT (OUTPATIENT)
Dept: PRIMARY CARE | Facility: CLINIC | Age: 65
End: 2025-01-22
Payer: MEDICARE

## 2025-01-22 VITALS
HEART RATE: 65 BPM | SYSTOLIC BLOOD PRESSURE: 122 MMHG | OXYGEN SATURATION: 97 % | DIASTOLIC BLOOD PRESSURE: 74 MMHG | BODY MASS INDEX: 27.05 KG/M2 | RESPIRATION RATE: 17 BRPM | WEIGHT: 188.49 LBS

## 2025-01-22 DIAGNOSIS — R21 RASH: Primary | ICD-10-CM

## 2025-01-22 DIAGNOSIS — R03.0 ELEVATED BP WITHOUT DIAGNOSIS OF HYPERTENSION: ICD-10-CM

## 2025-01-22 PROCEDURE — 1123F ACP DISCUSS/DSCN MKR DOCD: CPT | Performed by: INTERNAL MEDICINE

## 2025-01-22 PROCEDURE — 1159F MED LIST DOCD IN RCRD: CPT | Performed by: INTERNAL MEDICINE

## 2025-01-22 PROCEDURE — 99214 OFFICE O/P EST MOD 30 MIN: CPT | Performed by: INTERNAL MEDICINE

## 2025-01-22 PROCEDURE — 1160F RVW MEDS BY RX/DR IN RCRD: CPT | Performed by: INTERNAL MEDICINE

## 2025-01-22 PROCEDURE — 1125F AMNT PAIN NOTED PAIN PRSNT: CPT | Performed by: INTERNAL MEDICINE

## 2025-01-22 ASSESSMENT — ENCOUNTER SYMPTOMS
LOSS OF SENSATION IN FEET: 1
DIARRHEA: 1
DEPRESSION: 1
OCCASIONAL FEELINGS OF UNSTEADINESS: 1

## 2025-01-22 ASSESSMENT — PATIENT HEALTH QUESTIONNAIRE - PHQ9
1. LITTLE INTEREST OR PLEASURE IN DOING THINGS: SEVERAL DAYS
2. FEELING DOWN, DEPRESSED OR HOPELESS: SEVERAL DAYS
SUM OF ALL RESPONSES TO PHQ9 QUESTIONS 1 AND 2: 2
10. IF YOU CHECKED OFF ANY PROBLEMS, HOW DIFFICULT HAVE THESE PROBLEMS MADE IT FOR YOU TO DO YOUR WORK, TAKE CARE OF THINGS AT HOME, OR GET ALONG WITH OTHER PEOPLE: NOT DIFFICULT AT ALL

## 2025-01-22 ASSESSMENT — PAIN SCALES - GENERAL: PAINLEVEL_OUTOF10: 1

## 2025-01-23 NOTE — PROGRESS NOTES
Subjective   Patient ID: Loyd Rojas is a 64 y.o. male who presents for Diarrhea, Rash (Bilateral lower legs), Shoulder Pain (Right), Dry Mouth, and Itchy Skin (Bilateral forearms).    Follow-up visit.  He complains of multiple problems which he thinks are caused by medications that he takes.  He takes Topamax prescribed by neurologist for migraine and metoprolol prescribed by cardiologist.   Since taking these  medications he complains of feeling of burning in his mouth.  Rash on lower extremities itchy forearms, diarrhea.    Diarrhea     Rash  Associated symptoms include diarrhea.   Shoulder Pain          Review of Systems   Gastrointestinal:  Positive for diarrhea.   Skin:  Positive for rash.   All other systems reviewed and are negative.      Objective   /74 (BP Location: Left arm, Patient Position: Sitting)   Pulse 65   Resp 17   Wt 85.5 kg (188 lb 7.9 oz)   SpO2 97%   BMI 27.05 kg/m²     Physical Exam  Constitutional:       Appearance: Normal appearance.   Eyes:      Extraocular Movements: Extraocular movements intact.   Cardiovascular:      Rate and Rhythm: Regular rhythm.   Pulmonary:      Breath sounds: Normal breath sounds.   Abdominal:      Palpations: Abdomen is soft.   Musculoskeletal:         General: Normal range of motion.      Cervical back: Normal range of motion.   Skin:     Findings: Rash present.      Comments: Petechial rash lower extremities worse on the left one.   Neurological:      General: No focal deficit present.         Assessment/Plan   Problem List Items Addressed This Visit             ICD-10-CM    Elevated BP without diagnosis of hypertension R03.0     Side effects from metoprolol.     Advised to taper down metoprolol and stop  to see if the rash resolves.         Rash - Primary R21     Stop Topamax and metoprolol tapering dose.  He believes his medications are causing his symptoms diarrhea, skin rash, sore oral mucosa.  Follow-up in 1 month         Relevant Orders     Referral to Dermatology

## 2025-01-23 NOTE — ASSESSMENT & PLAN NOTE
Side effects from metoprolol.     Advised to taper down metoprolol and stop  to see if the rash resolves.

## 2025-01-23 NOTE — ASSESSMENT & PLAN NOTE
Stop Topamax and metoprolol tapering dose.  He believes his medications are causing his symptoms diarrhea, skin rash, sore oral mucosa.  Follow-up in 1 month

## 2025-01-27 ENCOUNTER — TELEPHONE (OUTPATIENT)
Dept: PRIMARY CARE | Facility: CLINIC | Age: 65
End: 2025-01-27
Payer: MEDICARE

## 2025-01-30 ENCOUNTER — APPOINTMENT (OUTPATIENT)
Dept: CARDIOLOGY | Facility: CLINIC | Age: 65
End: 2025-01-30
Payer: MEDICARE

## 2025-02-03 DIAGNOSIS — N40.1 BPH WITH OBSTRUCTION/LOWER URINARY TRACT SYMPTOMS: ICD-10-CM

## 2025-02-03 DIAGNOSIS — R31.9 HEMATURIA, UNSPECIFIED TYPE: ICD-10-CM

## 2025-02-03 DIAGNOSIS — R53.83 FATIGUE, UNSPECIFIED TYPE: ICD-10-CM

## 2025-02-03 DIAGNOSIS — N13.8 BPH WITH OBSTRUCTION/LOWER URINARY TRACT SYMPTOMS: ICD-10-CM

## 2025-02-03 DIAGNOSIS — E78.00 ELEVATED LDL CHOLESTEROL LEVEL: ICD-10-CM

## 2025-02-03 DIAGNOSIS — K21.9 GASTROESOPHAGEAL REFLUX DISEASE, UNSPECIFIED WHETHER ESOPHAGITIS PRESENT: ICD-10-CM

## 2025-02-06 ENCOUNTER — APPOINTMENT (OUTPATIENT)
Dept: PRIMARY CARE | Facility: CLINIC | Age: 65
End: 2025-02-06
Payer: MEDICARE

## 2025-02-09 LAB
ALBUMIN SERPL-MCNC: NORMAL G/DL
ALP SERPL-CCNC: NORMAL U/L
ALT SERPL-CCNC: NORMAL U/L
ANION GAP SERPL CALCULATED.4IONS-SCNC: NORMAL MMOL/L
AST SERPL-CCNC: NORMAL U/L
BASOPHILS # BLD AUTO: NORMAL 10*3/UL
BASOPHILS NFR BLD AUTO: NORMAL %
BILIRUB SERPL-MCNC: NORMAL MG/DL
BLASTS # BLD: NORMAL 10*3/UL
BLASTS NFR BLD MANUAL: NORMAL %
BUN SERPL-MCNC: NORMAL MG/DL
CALCIUM SERPL-MCNC: NORMAL MG/DL
CHLORIDE SERPL-SCNC: NORMAL MMOL/L
CHOLEST SERPL-MCNC: NORMAL MG/DL
CHOLEST/HDLC SERPL: NORMAL {RATIO}
CO2 SERPL-SCNC: NORMAL MMOL/L
CREAT SERPL-MCNC: NORMAL MG/DL
EGFRCR SERPLBLD CKD-EPI 2021: NORMAL ML/MIN/{1.73_M2}
EOSINOPHIL # BLD AUTO: NORMAL 10*3/UL
EOSINOPHIL NFR BLD AUTO: NORMAL %
ERYTHROCYTE [DISTWIDTH] IN BLOOD BY AUTOMATED COUNT: NORMAL %
ERYTHROCYTE [SEDIMENTATION RATE] IN BLOOD BY WESTERGREN METHOD: NORMAL MM/H
GLUCOSE SERPL-MCNC: NORMAL MG/DL
HCT VFR BLD AUTO: NORMAL %
HDLC SERPL-MCNC: NORMAL MG/DL
HGB BLD-MCNC: NORMAL G/DL
LDLC SERPL CALC-MCNC: NORMAL MG/DL
LYMPHOCYTES # BLD AUTO: NORMAL 10*3/UL
LYMPHOCYTES NFR BLD AUTO: NORMAL %
MCH RBC QN AUTO: NORMAL PG
MCHC RBC AUTO-ENTMCNC: NORMAL G/DL
MCV RBC AUTO: NORMAL FL
METAMYELOCYTES # BLD: NORMAL 10*3/UL
METAMYELOCYTES NFR BLD MANUAL: NORMAL %
MONOCYTES # BLD AUTO: NORMAL 10*3/UL
MONOCYTES NFR BLD AUTO: NORMAL %
MYELOCYTES # BLD: NORMAL 10*3/UL
MYELOCYTES NFR BLD MANUAL: NORMAL %
NEUTROPHILS # BLD AUTO: NORMAL 10*3/UL
NEUTROPHILS NFR BLD AUTO: NORMAL %
NEUTS BAND # BLD: NORMAL 10*3/UL
NEUTS BAND NFR BLD MANUAL: NORMAL %
NONHDLC SERPL-MCNC: NORMAL MG/DL
NRBC # BLD: NORMAL 10*3/UL
NRBC BLD-RTO: NORMAL /100{WBCS}
PLATELET # BLD AUTO: NORMAL 10*3/UL
PMV BLD REES-ECKER: NORMAL FL
POTASSIUM SERPL-SCNC: NORMAL MMOL/L
PROMYELOCYTES # BLD: NORMAL 10*3/UL
PROMYELOCYTES NFR BLD MANUAL: NORMAL %
PROT SERPL-MCNC: NORMAL G/DL
PSA SERPL-MCNC: NORMAL NG/ML
RBC # BLD AUTO: NORMAL 10*6/UL
SERVICE CMNT-IMP: NORMAL
SODIUM SERPL-SCNC: NORMAL MMOL/L
TRIGL SERPL-MCNC: NORMAL MG/DL
TSH SERPL-ACNC: NORMAL M[IU]/L
VARIANT LYMPHS NFR BLD: NORMAL %
WBC # BLD AUTO: NORMAL 10*3/UL

## 2025-02-11 DIAGNOSIS — N40.1 BPH WITH OBSTRUCTION/LOWER URINARY TRACT SYMPTOMS: Primary | ICD-10-CM

## 2025-02-11 DIAGNOSIS — N13.8 BPH WITH OBSTRUCTION/LOWER URINARY TRACT SYMPTOMS: Primary | ICD-10-CM

## 2025-02-11 LAB
ALBUMIN SERPL-MCNC: 4.2 G/DL (ref 3.6–5.1)
ALP SERPL-CCNC: 78 U/L (ref 35–144)
ALT SERPL-CCNC: 13 U/L (ref 9–46)
ANION GAP SERPL CALCULATED.4IONS-SCNC: 9 MMOL/L (CALC) (ref 7–17)
AST SERPL-CCNC: 20 U/L (ref 10–35)
BASOPHILS # BLD AUTO: 83 CELLS/UL (ref 0–200)
BASOPHILS NFR BLD AUTO: 1.5 %
BILIRUB SERPL-MCNC: 0.4 MG/DL (ref 0.2–1.2)
BUN SERPL-MCNC: 13 MG/DL (ref 7–25)
CALCIUM SERPL-MCNC: 9.4 MG/DL (ref 8.6–10.3)
CHLORIDE SERPL-SCNC: 106 MMOL/L (ref 98–110)
CHOLEST SERPL-MCNC: 203 MG/DL
CHOLEST/HDLC SERPL: 4.1 (CALC)
CO2 SERPL-SCNC: 26 MMOL/L (ref 20–32)
CREAT SERPL-MCNC: 0.87 MG/DL (ref 0.7–1.35)
EGFRCR SERPLBLD CKD-EPI 2021: 96 ML/MIN/1.73M2
EOSINOPHIL # BLD AUTO: 281 CELLS/UL (ref 15–500)
EOSINOPHIL NFR BLD AUTO: 5.1 %
ERYTHROCYTE [DISTWIDTH] IN BLOOD BY AUTOMATED COUNT: 12.5 % (ref 11–15)
ERYTHROCYTE [SEDIMENTATION RATE] IN BLOOD BY WESTERGREN METHOD: NORMAL MM/H
GLUCOSE SERPL-MCNC: 95 MG/DL (ref 65–99)
HCT VFR BLD AUTO: 43.5 % (ref 38.5–50)
HDLC SERPL-MCNC: 50 MG/DL
HGB BLD-MCNC: 14.3 G/DL (ref 13.2–17.1)
LDLC SERPL CALC-MCNC: 134 MG/DL (CALC)
LYMPHOCYTES # BLD AUTO: 1529 CELLS/UL (ref 850–3900)
LYMPHOCYTES NFR BLD AUTO: 27.8 %
MCH RBC QN AUTO: 31 PG (ref 27–33)
MCHC RBC AUTO-ENTMCNC: 32.9 G/DL (ref 32–36)
MCV RBC AUTO: 94.4 FL (ref 80–100)
MONOCYTES # BLD AUTO: 325 CELLS/UL (ref 200–950)
MONOCYTES NFR BLD AUTO: 5.9 %
NEUTROPHILS # BLD AUTO: 3284 CELLS/UL (ref 1500–7800)
NEUTROPHILS NFR BLD AUTO: 59.7 %
NONHDLC SERPL-MCNC: 153 MG/DL (CALC)
PLATELET # BLD AUTO: 299 THOUSAND/UL (ref 140–400)
PMV BLD REES-ECKER: 10.6 FL (ref 7.5–12.5)
POTASSIUM SERPL-SCNC: 4.4 MMOL/L (ref 3.5–5.3)
PROT SERPL-MCNC: 6.9 G/DL (ref 6.1–8.1)
PSA SERPL-MCNC: 2.21 NG/ML
RBC # BLD AUTO: 4.61 MILLION/UL (ref 4.2–5.8)
SODIUM SERPL-SCNC: 141 MMOL/L (ref 135–146)
TRIGL SERPL-MCNC: 86 MG/DL
TSH SERPL-ACNC: 0.76 MIU/L (ref 0.4–4.5)
WBC # BLD AUTO: 5.5 THOUSAND/UL (ref 3.8–10.8)

## 2025-03-17 ENCOUNTER — OFFICE VISIT (OUTPATIENT)
Dept: PRIMARY CARE | Facility: CLINIC | Age: 65
End: 2025-03-17
Payer: MEDICARE

## 2025-03-17 VITALS
DIASTOLIC BLOOD PRESSURE: 78 MMHG | OXYGEN SATURATION: 96 % | RESPIRATION RATE: 16 BRPM | SYSTOLIC BLOOD PRESSURE: 133 MMHG | HEART RATE: 65 BPM

## 2025-03-17 DIAGNOSIS — G89.29 CHRONIC RIGHT SHOULDER PAIN: ICD-10-CM

## 2025-03-17 DIAGNOSIS — M25.511 CHRONIC RIGHT SHOULDER PAIN: ICD-10-CM

## 2025-03-17 DIAGNOSIS — G43.709 CHRONIC MIGRAINE WITHOUT AURA WITHOUT STATUS MIGRAINOSUS, NOT INTRACTABLE: Primary | ICD-10-CM

## 2025-03-17 DIAGNOSIS — R79.89 HIGH SERUM LOW-DENSITY LIPOPROTEIN (LDL): ICD-10-CM

## 2025-03-17 PROBLEM — R11.2 NAUSEA WITH VOMITING: Status: RESOLVED | Noted: 2023-10-05 | Resolved: 2025-03-17

## 2025-03-17 PROBLEM — H43.813 PVD (POSTERIOR VITREOUS DETACHMENT), BOTH EYES: Status: ACTIVE | Noted: 2024-06-18

## 2025-03-17 PROBLEM — H25.13 NUCLEAR SCLEROSIS OF BOTH EYES: Status: ACTIVE | Noted: 2024-06-18

## 2025-03-17 PROBLEM — K52.9 CHRONIC DIARRHEA: Status: RESOLVED | Noted: 2023-10-05 | Resolved: 2025-03-17

## 2025-03-17 PROBLEM — H52.7 REFRACTIVE ERROR: Status: ACTIVE | Noted: 2024-06-18

## 2025-03-17 PROBLEM — R06.02 SOB (SHORTNESS OF BREATH) ON EXERTION: Status: RESOLVED | Noted: 2023-10-05 | Resolved: 2025-03-17

## 2025-03-17 PROBLEM — Z20.822 EXPOSURE TO SEVERE ACUTE RESPIRATORY SYNDROME CORONAVIRUS 2 (SARS-COV-2): Status: RESOLVED | Noted: 2025-03-17 | Resolved: 2025-03-17

## 2025-03-17 PROCEDURE — G2211 COMPLEX E/M VISIT ADD ON: HCPCS | Performed by: INTERNAL MEDICINE

## 2025-03-17 PROCEDURE — 1159F MED LIST DOCD IN RCRD: CPT | Performed by: INTERNAL MEDICINE

## 2025-03-17 PROCEDURE — 1160F RVW MEDS BY RX/DR IN RCRD: CPT | Performed by: INTERNAL MEDICINE

## 2025-03-17 PROCEDURE — 1123F ACP DISCUSS/DSCN MKR DOCD: CPT | Performed by: INTERNAL MEDICINE

## 2025-03-17 PROCEDURE — 99214 OFFICE O/P EST MOD 30 MIN: CPT | Performed by: INTERNAL MEDICINE

## 2025-03-17 PROCEDURE — 1125F AMNT PAIN NOTED PAIN PRSNT: CPT | Performed by: INTERNAL MEDICINE

## 2025-03-17 RX ORDER — TOPIRAMATE 25 MG/1
TABLET ORAL
Qty: 53 TABLET | Refills: 0 | Status: SHIPPED | OUTPATIENT
Start: 2025-03-17 | End: 2025-04-16

## 2025-03-17 ASSESSMENT — ENCOUNTER SYMPTOMS
LOSS OF SENSATION IN FEET: 1
DEPRESSION: 0
OCCASIONAL FEELINGS OF UNSTEADINESS: 0
HEADACHES: 1

## 2025-03-17 ASSESSMENT — PAIN SCALES - GENERAL: PAINLEVEL_OUTOF10: 1

## 2025-03-17 NOTE — ASSESSMENT & PLAN NOTE
Continue physical therapy.  See orthopedic specialist in consult.  Have x-rays of the right shoulder.  Take Tylenol as needed 500 mg every 8 hours.

## 2025-03-17 NOTE — ASSESSMENT & PLAN NOTE
, advised to follow low-cholesterol diet avoid fast food fried food processed meats, red meat.  Increase physical activity

## 2025-03-17 NOTE — PROGRESS NOTES
Subjective   Patient ID: Loyd Rojas is a 64 y.o. male who presents for Shoulder Pain (Right) and Medication Problem.    Follow-up visit.  He complains about right shoulder pain for the past 5 months after throwing a Nancy in the backyard.  Has had physical therapy for the past 2 weeks.  He has history of migraine headaches was taking Topamax which was discontinued at the last visit due to different complaints.  He tells me today Topamax was helping his headaches and wants to resume it.  Takes Tylenol occasionally for shoulder pain.    Shoulder Pain          Review of Systems   Musculoskeletal:         Rt. Shoulder pain   Neurological:  Positive for headaches.   All other systems reviewed and are negative.      Objective   /78 (BP Location: Left arm, Patient Position: Sitting)   Pulse 65   Resp 16   SpO2 96%     Physical Exam  Constitutional:       Appearance: Normal appearance.   Eyes:      Extraocular Movements: Extraocular movements intact.   Cardiovascular:      Rate and Rhythm: Regular rhythm.   Pulmonary:      Breath sounds: Normal breath sounds.   Abdominal:      Palpations: Abdomen is soft.   Musculoskeletal:         General: Tenderness present.      Cervical back: Normal range of motion.      Comments: Tenderness to deep palpation right acromioclavicular joint.  Limited ROM with internal rotation right upper extremity.         Assessment/Plan

## 2025-03-18 ENCOUNTER — HOSPITAL ENCOUNTER (OUTPATIENT)
Dept: RADIOLOGY | Facility: HOSPITAL | Age: 65
Discharge: HOME | End: 2025-03-18
Payer: MEDICARE

## 2025-03-18 DIAGNOSIS — G89.29 CHRONIC RIGHT SHOULDER PAIN: ICD-10-CM

## 2025-03-18 DIAGNOSIS — M25.511 CHRONIC RIGHT SHOULDER PAIN: ICD-10-CM

## 2025-03-18 PROCEDURE — 73030 X-RAY EXAM OF SHOULDER: CPT | Mod: RT

## 2025-03-18 PROCEDURE — 73030 X-RAY EXAM OF SHOULDER: CPT | Mod: RIGHT SIDE | Performed by: RADIOLOGY

## 2025-03-26 ENCOUNTER — APPOINTMENT (OUTPATIENT)
Dept: NEUROLOGY | Facility: CLINIC | Age: 65
End: 2025-03-26
Payer: MEDICARE

## 2025-03-31 ENCOUNTER — APPOINTMENT (OUTPATIENT)
Dept: ORTHOPEDIC SURGERY | Facility: CLINIC | Age: 65
End: 2025-03-31
Payer: MEDICARE

## 2025-03-31 VITALS — WEIGHT: 188 LBS | BODY MASS INDEX: 27.85 KG/M2 | HEIGHT: 69 IN

## 2025-03-31 DIAGNOSIS — S46.011A ROTATOR CUFF STRAIN, RIGHT, INITIAL ENCOUNTER: Primary | ICD-10-CM

## 2025-03-31 ASSESSMENT — PAIN - FUNCTIONAL ASSESSMENT: PAIN_FUNCTIONAL_ASSESSMENT: 0-10

## 2025-03-31 ASSESSMENT — PAIN SCALES - GENERAL: PAINLEVEL_OUTOF10: 6

## 2025-04-02 RX ORDER — TRIAMCINOLONE ACETONIDE 40 MG/ML
40 INJECTION, SUSPENSION INTRA-ARTICULAR; INTRAMUSCULAR
Status: COMPLETED | OUTPATIENT
Start: 2025-04-02 | End: 2025-04-02

## 2025-04-02 RX ORDER — LIDOCAINE HYDROCHLORIDE 10 MG/ML
4 INJECTION, SOLUTION INFILTRATION; PERINEURAL
Status: COMPLETED | OUTPATIENT
Start: 2025-04-02 | End: 2025-04-02

## 2025-04-02 RX ADMIN — TRIAMCINOLONE ACETONIDE 40 MG: 40 INJECTION, SUSPENSION INTRA-ARTICULAR; INTRAMUSCULAR at 13:50

## 2025-04-02 RX ADMIN — LIDOCAINE HYDROCHLORIDE 4 ML: 10 INJECTION, SOLUTION INFILTRATION; PERINEURAL at 13:50

## 2025-04-02 NOTE — PROGRESS NOTES
64-year-old male presents to clinic today for evaluation of right shoulder.  He had an incident that occurred back in November after throwing an orange he had immediate right shoulder discomfort.  He has continued pain flareups that have been difficulty with certain activities.  He has been in physical therapy for approximately 3 to 4 weeks without any significant improvement or changes.  Has used Tylenol as well as topicals with some mild improvement.  Most of his pain is located over the anterior superior aspect of the right shoulder.    Patient's self reported past medical history, medications, allergies, surgical history, family and social history as well as a 10 point review of systems has been documented in the new patient intake form and scanned into the patient's electronic medical record. Pertinent findings are documented in the HPI.    Physical Examination Findings:  Constitutional: Appears well-developed and well-nourished.  Head: Normocephalic and atraumatic.  Eyes: Pupils are equal and round.  Cardiovascular: Intact distal pulses.   Respiratory: Effort normal. No respiratory distress.  Neurologic: Alert and oriented to person, place, and time.  Skin: Skin is warm and dry.  Hematologic / Lymphatic: No lymphedema, lymphangitis.  Psychiatric: normal mood and affect. Behavior is normal.   Musculoskeletal: Right shoulder forward arm elevation 180 degrees abduction 170 degrees.  Motor power in abduction 5/5 external rotation 5/5, internal rotation 5/5.  Pain with terminal end range of motion.  Pain with resisted shoulder abduction.    Review of x-rays taken of the right shoulder reveal no acute bony abnormality.  Glenohumeral joint changes consistent with degenerative wear.    Impression: Right shoulder pain    Plan: We have discussed his symptoms in detail today.  His x-rays do show some degenerative changes although most of his pain today seems to be coming from the subacromial origin.  I have discussed  differential diagnosis is including rotator cuff pathology.  At this time given he has not gotten any significant relief with conservative management with over-the-counter medication and therapy we have discussed steroid injection.  Subacromial steroid injection was performed today and tolerated well.  If he continues to have issues and does not get relief following injection recommendation is for further evaluation with right shoulder MRI.    Patient ID: Loyd Rojas is a 64 y.o. male.    L Inj/Asp: R subacromial bursa on 4/2/2025 1:50 PM  Details: 22 G needle, posterior approach  Medications: 40 mg triamcinolone acetonide 40 mg/mL; 4 mL lidocaine 10 mg/mL (1 %)  Procedure, treatment alternatives, risks and benefits explained, specific risks discussed. Immediately prior to procedure a time out was called to verify the correct patient, procedure, equipment, support staff and site/side marked as required.         Meli Barroso PA-C  Department of Orthopaedic Surgery  [facility]    Dictation performed with the use of voice recognition software. Syntax and grammatical errors may exist.

## 2025-04-09 ENCOUNTER — TELEPHONE (OUTPATIENT)
Dept: PRIMARY CARE | Facility: CLINIC | Age: 65
End: 2025-04-09
Payer: MEDICARE

## 2025-04-14 DIAGNOSIS — G43.709 CHRONIC MIGRAINE WITHOUT AURA WITHOUT STATUS MIGRAINOSUS, NOT INTRACTABLE: ICD-10-CM

## 2025-04-14 RX ORDER — TOPIRAMATE 25 MG/1
TABLET ORAL
Qty: 53 TABLET | Refills: 0 | Status: SHIPPED | OUTPATIENT
Start: 2025-04-14 | End: 2025-05-14

## 2025-04-25 DIAGNOSIS — S46.011A ROTATOR CUFF STRAIN, RIGHT, INITIAL ENCOUNTER: Primary | ICD-10-CM

## 2025-05-05 ENCOUNTER — APPOINTMENT (OUTPATIENT)
Dept: PRIMARY CARE | Facility: CLINIC | Age: 65
End: 2025-05-05
Payer: MEDICARE

## 2025-05-05 ENCOUNTER — HOSPITAL ENCOUNTER (OUTPATIENT)
Dept: RADIOLOGY | Facility: CLINIC | Age: 65
Discharge: HOME | End: 2025-05-05
Payer: MEDICARE

## 2025-05-05 DIAGNOSIS — S46.011A ROTATOR CUFF STRAIN, RIGHT, INITIAL ENCOUNTER: ICD-10-CM

## 2025-05-05 PROCEDURE — 73221 MRI JOINT UPR EXTREM W/O DYE: CPT | Mod: RT

## 2025-05-05 PROCEDURE — 73221 MRI JOINT UPR EXTREM W/O DYE: CPT | Mod: RIGHT SIDE | Performed by: RADIOLOGY

## 2025-05-27 ENCOUNTER — APPOINTMENT (OUTPATIENT)
Dept: NEUROLOGY | Facility: HOSPITAL | Age: 65
End: 2025-05-27
Payer: MEDICARE

## 2025-06-04 DIAGNOSIS — F41.9 ANXIETY DISORDER, UNSPECIFIED: ICD-10-CM

## 2025-06-04 RX ORDER — OMEPRAZOLE 40 MG/1
40 CAPSULE, DELAYED RELEASE ORAL DAILY
Qty: 90 CAPSULE | Refills: 0 | Status: SHIPPED | OUTPATIENT
Start: 2025-06-04

## 2025-08-04 ENCOUNTER — APPOINTMENT (OUTPATIENT)
Dept: PRIMARY CARE | Facility: CLINIC | Age: 65
End: 2025-08-04
Payer: MEDICARE

## 2025-08-05 ENCOUNTER — APPOINTMENT (OUTPATIENT)
Dept: NEUROLOGY | Facility: HOSPITAL | Age: 65
End: 2025-08-05
Payer: MEDICARE

## 2025-08-30 DIAGNOSIS — F41.9 ANXIETY DISORDER, UNSPECIFIED: ICD-10-CM

## 2025-09-02 RX ORDER — OMEPRAZOLE 40 MG/1
40 CAPSULE, DELAYED RELEASE ORAL DAILY
Qty: 90 CAPSULE | Refills: 0 | Status: SHIPPED | OUTPATIENT
Start: 2025-09-02

## 2025-10-29 ENCOUNTER — APPOINTMENT (OUTPATIENT)
Dept: PRIMARY CARE | Facility: CLINIC | Age: 65
End: 2025-10-29
Payer: MEDICARE

## 2025-11-17 ENCOUNTER — APPOINTMENT (OUTPATIENT)
Dept: NEUROLOGY | Facility: CLINIC | Age: 65
End: 2025-11-17
Payer: MEDICARE

## 2026-01-08 ENCOUNTER — APPOINTMENT (OUTPATIENT)
Dept: PRIMARY CARE | Facility: CLINIC | Age: 66
End: 2026-01-08
Payer: MEDICARE

## (undated) DEVICE — BANDAGE, ELASTIC, MATRIX, SELF-CLOSURE, 6 IN X 5 YD, LF

## (undated) DEVICE — Device

## (undated) DEVICE — SUTURE, ETHILON, 3-0, 18 IN, PS1, BLACK

## (undated) DEVICE — SUTURE, STRATAFIX, 3-0, SPIRAL MONOCRYL PLUS, UNDYED 20CM  SH

## (undated) DEVICE — BLADE, OSCILLATING/SAGITTAL, 31MM X 9MM

## (undated) DEVICE — GLOVE, SURGICAL, PROTEXIS PI ORTHO, 7.5, PF, LF

## (undated) DEVICE — SUTURE, MONOCRYL, 4-0, 18 IN, PS2, UNDYED